# Patient Record
Sex: FEMALE | Race: WHITE | NOT HISPANIC OR LATINO
[De-identification: names, ages, dates, MRNs, and addresses within clinical notes are randomized per-mention and may not be internally consistent; named-entity substitution may affect disease eponyms.]

---

## 2017-03-31 PROBLEM — Z00.00 ENCOUNTER FOR PREVENTIVE HEALTH EXAMINATION: Status: ACTIVE | Noted: 2017-03-31

## 2017-04-04 ENCOUNTER — APPOINTMENT (OUTPATIENT)
Dept: PLASTIC SURGERY | Facility: CLINIC | Age: 22
End: 2017-04-04

## 2017-05-15 ENCOUNTER — RECORD ABSTRACTING (OUTPATIENT)
Age: 22
End: 2017-05-15

## 2017-05-30 ENCOUNTER — APPOINTMENT (OUTPATIENT)
Dept: PLASTIC SURGERY | Facility: CLINIC | Age: 22
End: 2017-05-30

## 2017-08-26 ENCOUNTER — EMERGENCY (EMERGENCY)
Facility: HOSPITAL | Age: 22
LOS: 0 days | Discharge: HOME | End: 2017-08-26
Admitting: PEDIATRICS

## 2017-08-26 DIAGNOSIS — N76.0 ACUTE VAGINITIS: ICD-10-CM

## 2017-08-26 DIAGNOSIS — B96.89 OTHER SPECIFIED BACTERIAL AGENTS AS THE CAUSE OF DISEASES CLASSIFIED ELSEWHERE: ICD-10-CM

## 2017-08-26 DIAGNOSIS — N89.8 OTHER SPECIFIED NONINFLAMMATORY DISORDERS OF VAGINA: ICD-10-CM

## 2018-02-27 ENCOUNTER — APPOINTMENT (OUTPATIENT)
Dept: PLASTIC SURGERY | Facility: CLINIC | Age: 23
End: 2018-02-27
Payer: MEDICAID

## 2018-02-27 DIAGNOSIS — M65.4 RADIAL STYLOID TENOSYNOVITIS [DE QUERVAIN]: ICD-10-CM

## 2018-02-27 PROCEDURE — 20550 NJX 1 TENDON SHEATH/LIGAMENT: CPT

## 2018-02-27 PROCEDURE — 99212 OFFICE O/P EST SF 10 MIN: CPT | Mod: 25

## 2018-04-26 ENCOUNTER — APPOINTMENT (OUTPATIENT)
Dept: PLASTIC SURGERY | Facility: CLINIC | Age: 23
End: 2018-04-26
Payer: MEDICAID

## 2018-04-26 PROCEDURE — 99212 OFFICE O/P EST SF 10 MIN: CPT

## 2018-07-13 ENCOUNTER — APPOINTMENT (OUTPATIENT)
Dept: PLASTIC SURGERY | Facility: CLINIC | Age: 23
End: 2018-07-13

## 2018-07-16 ENCOUNTER — EMERGENCY (EMERGENCY)
Facility: HOSPITAL | Age: 23
LOS: 0 days | Discharge: HOME | End: 2018-07-16
Admitting: EMERGENCY MEDICINE

## 2018-07-16 VITALS
TEMPERATURE: 99 F | OXYGEN SATURATION: 100 % | HEART RATE: 88 BPM | SYSTOLIC BLOOD PRESSURE: 124 MMHG | DIASTOLIC BLOOD PRESSURE: 85 MMHG

## 2018-07-16 DIAGNOSIS — M54.9 DORSALGIA, UNSPECIFIED: ICD-10-CM
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
## 2018-07-16 NOTE — ED PROVIDER NOTE - PHYSICAL EXAMINATION
CONST: Well appearing in NAD  EYES: PERRL, EOMI, Sclera and conjunctiva clear.   ENT: No nasal discharge. TM's clear B/L without drainage. Oropharynx normal appearing, no erythema or exudates. Uvula midline.  NECK: Non-tender, normal ROM  CARD: Normal S1 S2; Normal rate and rhythm  RESP: Equal BS B/L, No wheezes, rhonchi or rales. No distress  GI: Soft, non-tender, non-distended.  MS: + paraspinal tenderness throughout, pulses 2 +, Normal ROM in all extremities. No midline spinal tenderness.  SKIN: Warm, dry, no acute rashes. Good turgor  NEURO: A&Ox3, No focal deficits. Strength 5/5 with no sensory deficits. Steady gait

## 2018-07-16 NOTE — ED PROVIDER NOTE - OBJECTIVE STATEMENT
23 year old female with no pmhx presents with back pain since this morning. 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 Pt admits did not take any medication yet for pain. pt denies neck pain, HA, dizziness, paresthesias, or weakness, fever, chills, abdominal pain, or N/V/D.

## 2018-07-16 NOTE — ED PROVIDER NOTE - NS ED ROS FT
Review of Systems:  	•	CONSTITUTIONAL - no fever, no diaphoresis, no chills  	•	SKIN - no rash  	•	EYES - no eye pain, no blurry vision  	•	ENT - no change in hearing, no sore throat, no ear pain or tinnitus  	•	RESPIRATORY - no shortness of breath, no cough  	•	CARDIAC - no chest pain, no palpitations  	•	GI - no abd pain, no nausea, no vomiting, no diarrhea, no constipation  	•	GENITO-URINARY - no discharge, no dysuria; no hematuria, no increased urinary frequency  	•	MUSCULOSKELETAL - + back pain   	•	NEUROLOGIC - no weakness, no headache, no paresthesias, no LOC

## 2018-07-27 ENCOUNTER — APPOINTMENT (OUTPATIENT)
Dept: PLASTIC SURGERY | Facility: CLINIC | Age: 23
End: 2018-07-27

## 2018-11-09 ENCOUNTER — OUTPATIENT (OUTPATIENT)
Dept: OUTPATIENT SERVICES | Facility: HOSPITAL | Age: 23
LOS: 1 days | Discharge: HOME | End: 2018-11-09

## 2018-11-10 DIAGNOSIS — R82.90 UNSPECIFIED ABNORMAL FINDINGS IN URINE: ICD-10-CM

## 2018-11-10 DIAGNOSIS — N39.0 URINARY TRACT INFECTION, SITE NOT SPECIFIED: ICD-10-CM

## 2018-11-10 DIAGNOSIS — Z11.3 ENCOUNTER FOR SCREENING FOR INFECTIONS WITH A PREDOMINANTLY SEXUAL MODE OF TRANSMISSION: ICD-10-CM

## 2019-12-27 ENCOUNTER — EMERGENCY (EMERGENCY)
Facility: HOSPITAL | Age: 24
LOS: 0 days | Discharge: HOME | End: 2019-12-27
Attending: EMERGENCY MEDICINE | Admitting: EMERGENCY MEDICINE
Payer: SUBSIDIZED

## 2019-12-27 VITALS
RESPIRATION RATE: 18 BRPM | DIASTOLIC BLOOD PRESSURE: 56 MMHG | TEMPERATURE: 98 F | OXYGEN SATURATION: 99 % | SYSTOLIC BLOOD PRESSURE: 118 MMHG | HEART RATE: 107 BPM

## 2019-12-27 VITALS
TEMPERATURE: 101 F | RESPIRATION RATE: 16 BRPM | HEIGHT: 61 IN | OXYGEN SATURATION: 97 % | HEART RATE: 122 BPM | DIASTOLIC BLOOD PRESSURE: 64 MMHG | SYSTOLIC BLOOD PRESSURE: 140 MMHG | WEIGHT: 169.98 LBS

## 2019-12-27 DIAGNOSIS — R50.9 FEVER, UNSPECIFIED: ICD-10-CM

## 2019-12-27 DIAGNOSIS — N12 TUBULO-INTERSTITIAL NEPHRITIS, NOT SPECIFIED AS ACUTE OR CHRONIC: ICD-10-CM

## 2019-12-27 DIAGNOSIS — R10.9 UNSPECIFIED ABDOMINAL PAIN: ICD-10-CM

## 2019-12-27 LAB
ALBUMIN SERPL ELPH-MCNC: 4.5 G/DL — SIGNIFICANT CHANGE UP (ref 3.5–5.2)
ALP SERPL-CCNC: 86 U/L — SIGNIFICANT CHANGE UP (ref 30–115)
ALT FLD-CCNC: 32 U/L — SIGNIFICANT CHANGE UP (ref 0–41)
ANION GAP SERPL CALC-SCNC: 15 MMOL/L — HIGH (ref 7–14)
APPEARANCE UR: ABNORMAL
AST SERPL-CCNC: 31 U/L — SIGNIFICANT CHANGE UP (ref 0–41)
BACTERIA # UR AUTO: ABNORMAL
BASE EXCESS BLDV CALC-SCNC: 2.5 MMOL/L — HIGH (ref -2–2)
BASOPHILS # BLD AUTO: 0.03 K/UL — SIGNIFICANT CHANGE UP (ref 0–0.2)
BASOPHILS NFR BLD AUTO: 0.4 % — SIGNIFICANT CHANGE UP (ref 0–1)
BILIRUB SERPL-MCNC: 0.8 MG/DL — SIGNIFICANT CHANGE UP (ref 0.2–1.2)
BILIRUB UR-MCNC: NEGATIVE — SIGNIFICANT CHANGE UP
BUN SERPL-MCNC: 7 MG/DL — LOW (ref 10–20)
CA-I SERPL-SCNC: 1.17 MMOL/L — SIGNIFICANT CHANGE UP (ref 1.12–1.3)
CALCIUM SERPL-MCNC: 9.4 MG/DL — SIGNIFICANT CHANGE UP (ref 8.5–10.1)
CHLORIDE SERPL-SCNC: 99 MMOL/L — SIGNIFICANT CHANGE UP (ref 98–110)
CO2 SERPL-SCNC: 25 MMOL/L — SIGNIFICANT CHANGE UP (ref 17–32)
COLOR SPEC: YELLOW — SIGNIFICANT CHANGE UP
CREAT SERPL-MCNC: 0.9 MG/DL — SIGNIFICANT CHANGE UP (ref 0.7–1.5)
DIFF PNL FLD: ABNORMAL
EOSINOPHIL # BLD AUTO: 0.02 K/UL — SIGNIFICANT CHANGE UP (ref 0–0.7)
EOSINOPHIL NFR BLD AUTO: 0.2 % — SIGNIFICANT CHANGE UP (ref 0–8)
EPI CELLS # UR: 3 /HPF — SIGNIFICANT CHANGE UP (ref 0–5)
GAS PNL BLDV: 140 MMOL/L — SIGNIFICANT CHANGE UP (ref 136–145)
GAS PNL BLDV: SIGNIFICANT CHANGE UP
GLUCOSE SERPL-MCNC: 101 MG/DL — HIGH (ref 70–99)
GLUCOSE UR QL: NEGATIVE — SIGNIFICANT CHANGE UP
HCO3 BLDV-SCNC: 27 MMOL/L — SIGNIFICANT CHANGE UP (ref 22–29)
HCT VFR BLD CALC: 39.5 % — SIGNIFICANT CHANGE UP (ref 37–47)
HCT VFR BLDA CALC: 39.7 % — SIGNIFICANT CHANGE UP (ref 34–44)
HGB BLD CALC-MCNC: 13 G/DL — LOW (ref 14–18)
HGB BLD-MCNC: 13.2 G/DL — SIGNIFICANT CHANGE UP (ref 12–16)
HYALINE CASTS # UR AUTO: 1 /LPF — SIGNIFICANT CHANGE UP (ref 0–7)
IMM GRANULOCYTES NFR BLD AUTO: 0.5 % — HIGH (ref 0.1–0.3)
KETONES UR-MCNC: SIGNIFICANT CHANGE UP
LACTATE BLDV-MCNC: 0.8 MMOL/L — SIGNIFICANT CHANGE UP (ref 0.5–1.6)
LACTATE SERPL-SCNC: 0.9 MMOL/L — SIGNIFICANT CHANGE UP (ref 0.7–2)
LEUKOCYTE ESTERASE UR-ACNC: ABNORMAL
LIDOCAIN IGE QN: 31 U/L — SIGNIFICANT CHANGE UP (ref 7–60)
LYMPHOCYTES # BLD AUTO: 1.45 K/UL — SIGNIFICANT CHANGE UP (ref 1.2–3.4)
LYMPHOCYTES # BLD AUTO: 17.7 % — LOW (ref 20.5–51.1)
MCHC RBC-ENTMCNC: 29.2 PG — SIGNIFICANT CHANGE UP (ref 27–31)
MCHC RBC-ENTMCNC: 33.4 G/DL — SIGNIFICANT CHANGE UP (ref 32–37)
MCV RBC AUTO: 87.4 FL — SIGNIFICANT CHANGE UP (ref 81–99)
MONOCYTES # BLD AUTO: 1.26 K/UL — HIGH (ref 0.1–0.6)
MONOCYTES NFR BLD AUTO: 15.4 % — HIGH (ref 1.7–9.3)
NEUTROPHILS # BLD AUTO: 5.4 K/UL — SIGNIFICANT CHANGE UP (ref 1.4–6.5)
NEUTROPHILS NFR BLD AUTO: 65.8 % — SIGNIFICANT CHANGE UP (ref 42.2–75.2)
NITRITE UR-MCNC: NEGATIVE — SIGNIFICANT CHANGE UP
NRBC # BLD: 0 /100 WBCS — SIGNIFICANT CHANGE UP (ref 0–0)
PCO2 BLDV: 39 MMHG — LOW (ref 41–51)
PH BLDV: 7.44 — HIGH (ref 7.26–7.43)
PH UR: 6 — SIGNIFICANT CHANGE UP (ref 5–8)
PLATELET # BLD AUTO: 205 K/UL — SIGNIFICANT CHANGE UP (ref 130–400)
PO2 BLDV: 37 MMHG — SIGNIFICANT CHANGE UP (ref 20–40)
POTASSIUM BLDV-SCNC: 3.8 MMOL/L — SIGNIFICANT CHANGE UP (ref 3.3–5.6)
POTASSIUM SERPL-MCNC: 4.7 MMOL/L — SIGNIFICANT CHANGE UP (ref 3.5–5)
POTASSIUM SERPL-SCNC: 4.7 MMOL/L — SIGNIFICANT CHANGE UP (ref 3.5–5)
PROT SERPL-MCNC: 7.8 G/DL — SIGNIFICANT CHANGE UP (ref 6–8)
PROT UR-MCNC: ABNORMAL
RBC # BLD: 4.52 M/UL — SIGNIFICANT CHANGE UP (ref 4.2–5.4)
RBC # FLD: 11.8 % — SIGNIFICANT CHANGE UP (ref 11.5–14.5)
RBC CASTS # UR COMP ASSIST: 5 /HPF — HIGH (ref 0–4)
SAO2 % BLDV: 76 % — SIGNIFICANT CHANGE UP
SODIUM SERPL-SCNC: 139 MMOL/L — SIGNIFICANT CHANGE UP (ref 135–146)
SP GR SPEC: 1.02 — SIGNIFICANT CHANGE UP (ref 1.01–1.02)
UROBILINOGEN FLD QL: SIGNIFICANT CHANGE UP
WBC # BLD: 8.2 K/UL — SIGNIFICANT CHANGE UP (ref 4.8–10.8)
WBC # FLD AUTO: 8.2 K/UL — SIGNIFICANT CHANGE UP (ref 4.8–10.8)
WBC UR QL: 254 /HPF — HIGH (ref 0–5)

## 2019-12-27 PROCEDURE — 99284 EMERGENCY DEPT VISIT MOD MDM: CPT

## 2019-12-27 PROCEDURE — 99053 MED SERV 10PM-8AM 24 HR FAC: CPT

## 2019-12-27 PROCEDURE — 74177 CT ABD & PELVIS W/CONTRAST: CPT | Mod: 26

## 2019-12-27 RX ORDER — CEFTRIAXONE 500 MG/1
1000 INJECTION, POWDER, FOR SOLUTION INTRAMUSCULAR; INTRAVENOUS ONCE
Refills: 0 | Status: COMPLETED | OUTPATIENT
Start: 2019-12-27 | End: 2019-12-27

## 2019-12-27 RX ORDER — SODIUM CHLORIDE 9 MG/ML
1000 INJECTION, SOLUTION INTRAVENOUS ONCE
Refills: 0 | Status: COMPLETED | OUTPATIENT
Start: 2019-12-27 | End: 2019-12-27

## 2019-12-27 RX ORDER — CEFDINIR 250 MG/5ML
1 POWDER, FOR SUSPENSION ORAL
Qty: 20 | Refills: 0
Start: 2019-12-27 | End: 2020-01-05

## 2019-12-27 RX ORDER — SODIUM CHLORIDE 9 MG/ML
2000 INJECTION INTRAMUSCULAR; INTRAVENOUS; SUBCUTANEOUS ONCE
Refills: 0 | Status: COMPLETED | OUTPATIENT
Start: 2019-12-27 | End: 2019-12-27

## 2019-12-27 RX ORDER — ACETAMINOPHEN 500 MG
975 TABLET ORAL ONCE
Refills: 0 | Status: COMPLETED | OUTPATIENT
Start: 2019-12-27 | End: 2019-12-27

## 2019-12-27 RX ADMIN — CEFTRIAXONE 100 MILLIGRAM(S): 500 INJECTION, POWDER, FOR SOLUTION INTRAMUSCULAR; INTRAVENOUS at 05:20

## 2019-12-27 RX ADMIN — Medication 975 MILLIGRAM(S): at 05:40

## 2019-12-27 RX ADMIN — SODIUM CHLORIDE 4000 MILLILITER(S): 9 INJECTION INTRAMUSCULAR; INTRAVENOUS; SUBCUTANEOUS at 05:10

## 2019-12-27 RX ADMIN — SODIUM CHLORIDE 1000 MILLILITER(S): 9 INJECTION, SOLUTION INTRAVENOUS at 07:44

## 2019-12-27 RX ADMIN — Medication 975 MILLIGRAM(S): at 05:10

## 2019-12-27 RX ADMIN — CEFTRIAXONE 1000 MILLIGRAM(S): 500 INJECTION, POWDER, FOR SOLUTION INTRAMUSCULAR; INTRAVENOUS at 05:50

## 2019-12-27 RX ADMIN — SODIUM CHLORIDE 2000 MILLILITER(S): 9 INJECTION INTRAMUSCULAR; INTRAVENOUS; SUBCUTANEOUS at 06:10

## 2019-12-27 NOTE — ED PROVIDER NOTE - NS ED ROS FT
Review of Systems    Constitutional: (+) fever   Eyes/ENT: (-) vision changes  Cardiovascular: (-) chest pain, (-) syncope (-) palpitations  Respiratory: (-) cough, (-) shortness of breath  Gastrointestinal: (-) vomiting, (-) diarrhea (-) abdominal pain  Genitourinary:  (+) dysuria   Musculoskeletal: (-) neck pain, (+) back pain, (-) leg pain/swelling  Integumentary: (-) rash, (-) edema  Neurological: (-) headache  Hematologic: (-) easy bruising   Allergic/Immunologic: (-) pruritus

## 2019-12-27 NOTE — ED PROVIDER NOTE - OBJECTIVE STATEMENT
23 y/o F with PMH frequent UTIs presetns with constant, throbbing, moderate, non-radiating L flank pain x 2 days also with fever tmax 102.9F x 5 days.   did have dysuria x 2 wks, took azo with improvement and still has dysuria/increased frequency of urination, but minimal.   has been taking tylenol cold and flu without benefit.   no palliating/provoking factors.   no hx abdominal surgeries.

## 2019-12-27 NOTE — ED PROVIDER NOTE - CLINICAL SUMMARY MEDICAL DECISION MAKING FREE TEXT BOX
Patient presented with dysuria, (+) CVA tenderness on exam. Initially (+) febrile and tachycardic in ED, but normotensive. Seen on arrival and started on IVF, anti-pyretic. Obtained labs which were negative for significant leukocytosis, anemia, LAW or dehydration or any other significant abnormalities. UA confirmed (+) infection, and urine culture sent. CT abd/pelvis done to rule out septic stone which was negative, but confirmed diagnosis of pyelonephritis. Patient given dose of IV ceftriaxone in ED. After tx in ED patient's VS normalized and patient afebrile, felt much better. Ambulatory, tolerating PO. Will discharge home on PO abx and return precautions. Patient agreeable with plan. Agrees to return to ED for any new or worsening symptoms.

## 2019-12-27 NOTE — ED PROVIDER NOTE - ATTENDING CONTRIBUTION TO CARE
I personally evaluated the patient. I reviewed the Resident’s or Physician Assistant’s note (as assigned above), and agree with the findings and plan except as documented in my note.    Pt is a 25 y/o female with no PMH who presents to ED for dysuria and frequency for 1 weeks, moderate, constant, originally improved with cranberry pills but worse over past several days. + fever up to 102 and left flank pain. No n/v.    Constitutional: Well developed, well nourished. NAD.  Head: Normocephalic, atraumatic.  Eyes: PERRL. EOMI.  ENT: No nasal discharge. Mucous membranes moist.  Neck: Supple. Painless ROM.  Cardiovascular: Normal S1, S2. Regular rate and rhythm. No murmurs, rubs, or gallops.  Pulmonary: Normal respiratory rate and effort. Lungs clear to auscultation bilaterally. No wheezing, rales, or rhonchi.  Abdominal: Soft. Nondistended. Nontender. No rebound, guarding, rigidity. + left CVA tenderness.  Extremities. Pelvis stable. No lower extremity edema, symmetric calves.  Skin: No rashes, cyanosis.  Neuro: AAOx3. No focal neurological deficits.  Psych: Normal mood. Normal affect.

## 2019-12-27 NOTE — ED PROVIDER NOTE - PATIENT PORTAL LINK FT
You can access the FollowMyHealth Patient Portal offered by [facility] by registering at the following website: [email or web address]. By joining [facility]’s FollowMyHealth portal, you will also be able to view your health information using other applications (apps) compatible with our system.

## 2019-12-27 NOTE — ED ADULT TRIAGE NOTE - CHIEF COMPLAINT QUOTE
Patient presents to ED with intermittent fever for 5 days, left flank pain for 2 days worsening tonight, and UTI symptoms. Patient reports she gets UTIs often and became concerned when she continued to have fever.

## 2019-12-27 NOTE — ED ADULT NURSE NOTE - NSIMPLEMENTINTERV_GEN_ALL_ED
Implemented All Universal Safety Interventions:  [place] to call system. Call bell, personal items and telephone within reach. Instruct patient to call for assistance. Room bathroom lighting operational. Non-slip footwear when patient is off stretcher. Physically safe environment: no spills, clutter or unnecessary equipment. Stretcher in lowest position, wheels locked, appropriate side rails in place.

## 2019-12-27 NOTE — ED PROVIDER NOTE - NSFOLLOWUPINSTRUCTIONS_ED_ALL_ED_FT
You have a  kidney infectioncalled pyelonephritis. The infection can damage your kidneys and cause bacteria to enter your bloodstream. You were treated in the hospital with IV antibiotics and your symptoms improved.    Take all the medicine you were prescribed, even if you feel better. Not finishing the medicine can make the infection come back. It may also make a future infection harder to treat. Make sure to drink 8 to 12 glasses of fluid every day. Clear fluids, such as water, are best. To prevent future infection, always wipe the genital area from front to back and urinate frequently. Avoid holding urine in the bladder for a long time. Always urinate after sexual intercourse.    Seek medical attention immediately if you have any of the following:    Decreased urine output or trouble urinating  Severe pain in the lower back or flank  Fever of 100.4°F (38°C) or higher, or as directed by your healthcare provider  Shaking chills  Vomiting  Blood in your urine  Dark-colored or foul-smelling urine  Nausea or other problems that prevent you from taking your prescribed medicine

## 2019-12-27 NOTE — ED ADULT NURSE REASSESSMENT NOTE - NS ED NURSE REASSESS COMMENT FT1
Report received, patient assessed, patient reports relief of left flank pain at this time. Patient awaiting CT scan of abdomen and pelvis with IV contrast. Vital signs obtained and stable, will continue to monitor.

## 2019-12-27 NOTE — ED ADULT NURSE NOTE - OBJECTIVE STATEMENT
pt states she has hx of recurrent UTI's and bacteria in urine. now c/o left sided flank pain, fever, and generalized weakness.

## 2019-12-27 NOTE — ED PROVIDER NOTE - PHYSICAL EXAMINATION
PHYSICAL EXAM:    GENERAL: Alert, appears stated age, well appearing, non-toxic  SKIN: Warm, pink and dry. MMM.   EYE: Normal lids/conjunctiva  ENT: Normal hearing, patent oropharynx   NECK: +supple. No meningismus  Pulm: Bilateral BS, normal resp effort, no wheezes, stridor, or retractions  CV: RRR, no M/R/G, 2+and = radial pulses  Abd: soft, non-tender, non-distended, no hepatosplenomegaly. +L CVA tenderness. no rebound/guarding.   Mskel: no erythema, cyanosis, edema. no calf tenderness  Neuro: AAOx3, normal gait.

## 2020-01-01 LAB
CULTURE RESULTS: SIGNIFICANT CHANGE UP
CULTURE RESULTS: SIGNIFICANT CHANGE UP
SPECIMEN SOURCE: SIGNIFICANT CHANGE UP
SPECIMEN SOURCE: SIGNIFICANT CHANGE UP

## 2020-02-25 ENCOUNTER — EMERGENCY (EMERGENCY)
Facility: HOSPITAL | Age: 25
LOS: 0 days | Discharge: HOME | End: 2020-02-25
Admitting: EMERGENCY MEDICINE
Payer: OTHER MISCELLANEOUS

## 2020-02-25 VITALS
SYSTOLIC BLOOD PRESSURE: 144 MMHG | HEART RATE: 110 BPM | RESPIRATION RATE: 18 BRPM | TEMPERATURE: 99 F | DIASTOLIC BLOOD PRESSURE: 81 MMHG | OXYGEN SATURATION: 100 %
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
## 2020-02-25 DIAGNOSIS — M79.645 PAIN IN LEFT FINGER(S): ICD-10-CM
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
## 2020-02-25 DIAGNOSIS — S69.92XA UNSPECIFIED INJURY OF LEFT WRIST, HAND AND FINGER(S), INITIAL ENCOUNTER: ICD-10-CM

## 2020-02-25 PROCEDURE — 73140 X-RAY EXAM OF FINGER(S): CPT | Mod: 26,LT

## 2020-02-25 PROCEDURE — 99283 EMERGENCY DEPT VISIT LOW MDM: CPT

## 2020-02-25 NOTE — ED PROVIDER NOTE - NS ED ROS FT
Constitutional: (-) fever  Skin: (-) rash  Muskuloskeletal: (+) left 2nd finger injury  Neurological: (-) altered mental status

## 2020-02-25 NOTE — ED ADULT NURSE NOTE - NSIMPLEMENTINTERV_GEN_ALL_ED
Implemented All Universal Safety Interventions:  [place] to call system. Call bell, personal items and telephone within reach. Instruct patient to call for assistance. Room bathroom lighting operational. Non-slip footwear when patient is off stretcher. Physically safe environment: no spills, clutter or unnecessary equipment. Stretcher in lowest position, wheels locked, appropriate side rails in place.

## 2020-02-25 NOTE — ED PROVIDER NOTE - OBJECTIVE STATEMENT
25 yo F c/o left 2nd finger injury. 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 Right hand dominant.

## 2020-02-25 NOTE — ED PROVIDER NOTE - CARE PROVIDER_API CALL
Merlin Navarro)  Orthopaedic Surgery  [address] [place] [address]  Phone: [phone]  Fax: [phone]  Follow Up Time: 1-3 Days

## 2020-02-25 NOTE — ED PROVIDER NOTE - NSFOLLOWUPINSTRUCTIONS_ED_ALL_ED_FT
today Contusion    A contusion is a deep bruise. Contusions are the result of a blunt injury to tissues and muscle fibers under the skin. The skin overlying the contusion may turn blue, purple, or yellow. Symptoms also include pain and swelling in the injured area. Symptoms should resolve with time.     SEEK IMMEDIATE MEDICAL CARE IF YOU HAVE THE FOLLOWING SYMPTOMS: severe pain, numbness, tingling, pain, weakness, or skin color/temperature change in any part of your body distal to the fracture.

## 2020-02-25 NOTE — ED PROVIDER NOTE - PATIENT PORTAL LINK FT
You can access the FollowMyHealth Patient Portal offered by [facility] by registering at the following website: [email or web address]. By joining [facility]’s FollowMyHealth portal, you will also be able to view your health information using other applications (apps) compatible with our system.

## 2020-02-25 NOTE — ED PROVIDER NOTE - PHYSICAL EXAMINATION
Physical Exam    Vital Signs: I have reviewed the initial vital signs.  Constitutional: well-nourished, appears stated age, no acute distress  Skin: warm, dry  Muskuloskeletal: left hand: +tender, swelling, and  decreased ROM to base of left 2nd finger. No ecchymosis. n/v intact  Neuro: AOx3, No focal deficits noted Physical Exam    Vital Signs: I have reviewed the initial vital signs.  Constitutional: well-nourished, appears stated age, no acute distress  Skin: warm, dry  Muskuloskeletal: left hand: +tender, swelling, and  decreased ROM to base of left 2nd finger. No ecchymosis. n/v intact. Tendon function normal  Neuro: AOx3, No focal deficits noted

## 2020-02-25 NOTE — ED ADULT NURSE NOTE - CAS DISCH TRANSFER METHOD
Patient : Haley NOLAN [facility] Age: 79 year old Sex: female   MRN: [id number] Encounter Date: 12/20/2019      History     Chief Complaint   Patient presents with   • Dizziness   • Hypertension     HPI    79-year-old female with past medical history significant for polycythemia vera, generalized anxiety disorder, hypertension presents to the emergency department with multiple complaints.  Patient reports at least a 10 day history of ear fullness, neck pain, dizziness. Does have difficulty describing the dizziness although it does sound more like room spinning, off-balance.  States that it has worsened over the last 48 hours.  This morning at 2:00 a.m. awoke with the feeling of palpitations and hand tingling.  Was very short of breath at that time.  Those symptoms have improved.  States that she still feels very anxious.  Took her blood pressure this morning and it was elevated.  No chest pain currently.  Does also report coolness to her bilateral legs without pain.  No fevers.  Does have posterior neck pain which has been an ongoing issue for her.  Did see a massage therapist previously without improvement.  No urinary symptoms.  No abdominal pain.  Does have mild nausea without vomiting.  No other recent illness or injury.    No Known Allergies    Discharge Medication List as of 12/20/2019  1:40 PM      Prior to Admission Medications    Details   hydroxyurea (HYDREA) 500 MG capsule TAKE 1 CAPSULE BY MOUTH ONCE DAILYEprescribe, Disp-90 capsule, R-0      hydrochlorothiazide (HYDRODIURIL) 12.5 MG tablet Take 1 tablet by mouth daily.Eprescribe, Disp-90 tablet, R-3Put refills on file, do not fill until patient calls      atorvastatin (LIPITOR) 80 MG tablet TAKE 1 TABLET BY MOUTH NIGHTLYEprescribe, Disp-90 tablet, R-3      hydroxyurea (HYDREA) 500 MG capsule Take by mouth daily.Historical Med      Multiple Vitamin (MULTI-VITAMIN PO) Take by mouth daily. Historical Med      calcium carbonate-vitamin D (CALTRATE+D) 600-400 MG-UNIT  per tablet Take 1 tablet by mouth daily.Historical Med      aspirin (ECOTRIN) 81 MG EC tablet Take 81 mg by mouth daily.Historical Med             Discharge Medication List as of 2019  1:40 PM      New Prescriptions    Details   meclizine (ANTIVERT) 25 MG tablet Take 1 tablet by mouth 3 times daily as needed for Dizziness.Eprescribe, Disp-10 tablet, R-0             Past Medical History:   Diagnosis Date   • Ectropion of left lower eyelid    • Esophageal reflux disease     SOMETIMES   • Glaucoma (increased eye pressure)     A START OF GLAUCOMA/   • High cholesterol    • Malignant neoplasm (CMS/HCC)     SKIN CANCER   • Nuclear senile cataract of both eyes    • Osteopenia 2011   • PVD (posterior vitreous detachment), both eyes    • Sinusitis, chronic        Past Surgical History:   Procedure Laterality Date   • ANES TONSILLECTOMY PRIM/SECNDRY; AGE 14/     • APPENDECTOMY     • CARPAL TUNNEL RELEASE Bilateral 1985    BOTH WRISTS   • COLONOSCOPY W BIOPSY  2005   • DEXA BONE DENSITY AXIAL SKELETON  2011   • VARICOSE VEIN SURGERY Left 2019    Left GSV RFA & Stab Phlebectomy by Dr. Bernard Heath       Family History   Problem Relation Age of Onset   • Cancer Mother 85        PANCREAS   • High cholesterol Mother    • Cataracts Mother    • Hypertension Mother    • Heart disease Father    • High blood pressure Father    • High cholesterol Sister    • Heart disease Brother    • High cholesterol Brother    • High blood pressure Brother    • Cataracts Sister        Social History     Tobacco Use   • Smoking status: Former Smoker     Packs/day: 0.50     Years: 3.00     Pack years: 1.50     Types: Cigarettes     Last attempt to quit: 1970     Years since quittin.0   • Smokeless tobacco: Never Used   Substance Use Topics   • Alcohol use: No     Frequency: Never     Drinks per session: 1 or 2     Binge frequency: Never     Comment: NONE    • Drug use: No     Comment: NONE        Review  of Systems  Constitutional, Eyes, ENT, Pulmonary, Cardiovascular, Gastrointestinal, Renal, Endocrine, Genitourinary, Musculoskeletal, Neurologic, Skin, and Psychiatric systems were reviewed and negative unless indicated in the HPI above.     Physical Exam     ED Triage Vitals [12/20/19 1015]   ED Triage Vitals Group      Temp 97.9 °F (36.6 °C)      Pulse 84      Resp 20      BP (!) 186/102      SpO2 98 %      EtCO2 mmHg       Height       Weight       Weight Scale Used       BMI (Calculated)       IBW/kg (Calculated)        Physical Exam   Constitutional: She is oriented to person, place, and time. She appears well-developed and well-nourished. She is cooperative. No distress.   HENT:   Head: Normocephalic and atraumatic.   Eyes: Pupils are equal, round, and reactive to light. Conjunctivae and EOM are normal. Right eye exhibits no discharge. Left eye exhibits no discharge.   Neck: Normal range of motion. No tracheal deviation present.   Cardiovascular: Normal rate, regular rhythm and normal heart sounds. Exam reveals no gallop and no friction rub.   No murmur heard.  Pulses:       Radial pulses are 2+ on the right side.   Pulmonary/Chest: Effort normal and breath sounds normal. No stridor. No respiratory distress. She has no wheezes. She has no rales. She exhibits no tenderness.   Abdominal: Soft. She exhibits no distension. There is no tenderness. There is no rigidity, no rebound and no guarding. No hernia. Musculoskeletal: Normal range of motion.         General: No edema.     Neurological: She is alert and oriented to person, place, and time. She has normal strength. She is not disoriented. GCS eye subscore is 4. GCS verbal subscore is 5. GCS motor subscore is 6.   Alert and oriented x4.  GCS 15.  Cranial nerves II-XII intact.  Normal strength and sensation in the bilateral upper and lower extremities. Normal cerebellar function as tested by finger-to-nose testing.  Normal repetition.  Normal speech. Normal  naming of objects. 3/3 item recall at 5 min.   Skin: Skin is warm and dry. No rash noted. She is not diaphoretic.   Psychiatric: She has a normal mood and affect. Her speech is normal and behavior is normal.   Nursing note and vitals reviewed.      ED Course     Procedures    Lab Results     Results for orders placed or performed during the hospital encounter of 12/20/19   CBC with Automated Differential   Result Value Ref Range    WBC 6.2 4.2 - 11.0 K/mcL    RBC 4.27 4.00 - 5.20 mil/mcL    HGB 14.9 12.0 - 15.5 g/dL    HCT 43.5 36.0 - 46.5 %    .9 (H) 78.0 - 100.0 fl    MCH 34.9 (H) 26.0 - 34.0 pg    MCHC 34.3 32.0 - 36.5 g/dL    RDW-CV 14.2 11.0 - 15.0 %     140 - 450 K/mcL    NRBC 0 0 /100 WBC    DIFF TYPE AUTOMATED DIFFERENTIAL     Neutrophil 79 %    LYMPH 10 %    MONO 9 %    EOSIN 0 %    BASO 1 %    Percent Immature Granuloctyes 1 %    Absolute Neutrophil 4.9 1.8 - 7.7 K/mcL    Absolute Lymph 0.6 (L) 1.0 - 4.0 K/mcL    Absolute Mono 0.5 0.3 - 0.9 K/mcL    Absolute Eos 0.0 (L) 0.1 - 0.5 K/mcL    Absolute Baso 0.0 0.0 - 0.3 K/mcL    Absolute Immature Granulocytes 0.0 0 - 0.2 K/mcl   COMPREHENSIVE METABOLIC PANEL   Result Value Ref Range    Sodium 132 (L) 135 - 145 mmol/L    Potassium 3.9 3.4 - 5.1 mmol/L    Chloride 96 (L) 98 - 107 mmol/L    Carbon Dioxide 26 21 - 32 mmol/L    Anion Gap 14 10 - 20 mmol/L    Glucose 109 (H) 65 - 99 mg/dL    BUN 11 6 - 20 mg/dL    Creatinine 0.57 0.51 - 0.95 mg/dL    GFR Estimate,  >90     GFR Estimate, Non  89     BUN/Creatinine Ratio 19 7 - 25    CALCIUM 9.3 8.4 - 10.2 mg/dL    TOTAL BILIRUBIN 0.6 0.2 - 1.0 mg/dL    AST/SGOT 19 <38 Units/L    ALT/SGPT 25 <64 Units/L    ALK PHOSPHATASE 91 45 - 117 Units/L    TOTAL PROTEIN 7.3 6.4 - 8.2 g/dL    Albumin 4.3 3.6 - 5.1 g/dL    GLOBULIN 3.0 2.0 - 4.0 g/dL    A/G Ratio, Serum 1.4 1.0 - 2.4   Troponin I Ultra Sensitive   Result Value Ref Range    TROPONIN I <0.02 <0.05 ng/mL   Lipase   Result  Value Ref Range    Lipase 116 73 - 393 Units/L   Magnesium   Result Value Ref Range    MAGNESIUM 1.9 1.7 - 2.4 mg/dL   URINALYSIS & REFLEX MICRO WITH CULTURE IF INDICATED   Result Value Ref Range    COLOR YELLOW YELLOW    APPEARANCE CLEAR     GLUCOSE(URINE) NEGATIVE NEGATIVE mg/dL    BILIRUBIN NEGATIVE NEGATIVE    KETONES NEGATIVE NEGATIVE mg/dL    SPECIFIC GRAVITY <1.005 (L) 1.005 - 1.030    BLOOD NEGATIVE NEGATIVE    pH 7.0 5.0 - 7.0 Units    PROTEIN(URINE) NEGATIVE NEGATIVE mg/dL    UROBILINOGEN 0.2 0.0 - 1.0 mg/dL    NITRITE NEGATIVE NEGATIVE    LEUKOCYTE ESTERASE NEGATIVE NEGATIVE    SPECIMEN TYPE URINE CLEAN CATCH        EKG Results     EKG Interpretation  Rate: 81  Rhythm: normal sinus rhythm   NO STEMI  No Ischemic Changes  Abnormality: no    EKG tracing interpreted by ED physician    Radiology Results     Imaging Results          CT ANGIOGRAM HEAD NECK W CONTRAST (Final result)  Result time 12/20/19 13:01:05    Final result                 Impression:    IMPRESSION:  CT Head:  1. Small left frontal meningioma redemonstrated.    CTA Neck:  1. Patent cervical carotid and vertebral arteries without hemodynamically  significant stenosis.        CTA Head:  1. Patent major intracranial arterial vasculature without large vessel  occlusion or high-grade acquired stenosis.        Other:  1. Multiple degenerative changes of the cervical and upper thoracic spine.  2. 2 mm anterior right upper lobe lung nodule. Follow-up per Fleischner  Society criteria, detailed in the body of this report.          //Location Code: [facility]               Narrative:    CT ANGIOGRAM HEAD NECK W CONTRAST    CLINICAL INFORMATION:   Neck pain, dizziness for 10 days. ? Vert  dissection, posterior circulation stroke    COMPARISON:  CT head without contrast same day 12/20/2019, MRI brain  7/2/2018.    TECHNIQUE:  Using a multidetector, multislice helical scanner,     CTA of  the intracranial and extracranial circulation was acquired  after  administration of 75 cc of Isovue-370 intravenously.  Subsequently, delayed  phase CT head was performed with standard technique.  MIP      reconstructions are evaluated.          Determination of the degree of stenosis in the internal carotid arteries is  obtained using measurements of distal internal carotid diameter (directly  or indirectly) as the denominator for stenosis measurement.  The method  utilized is similar to that utilized in the [employer] Symptomatic  Carotid Endarterectomy Trial (NASCET) method.  If the degree of stenosis is  greater than 30%, the actual percentage stenosis is given in the body of  the report.       FINDINGS:    CT HEAD WITH CONTRAST:  Patchy white matter hypoattenuation, nonspecific but likely secondary to  advanced chronic microvascular ischemic change. Anterior left frontal  convexity partially calcified extra-axial dural based mass measuring 0.8 x  0.6 x 0.7 cm, compatible with meningioma, also seen on MRI from 7/2/2018.    CTA NECK:  Aortic arch: Mild to moderate atherosclerosis of the aortic arch without  significant narrowing. Brachiocephalic and subclavian arteries are patent  without significant stenosis.    Right carotid: Mild carotid bifurcation atherosclerosis without  hemodynamically significant narrowing of the common carotid artery,  internal cartotid artery, or proximal external carotid artery.                   Left carotid: Mild carotid bifurcation atherosclerosis without  hemodynamically significant narrowing of the common carotid artery,  internal cartotid artery, or proximal external carotid artery.                   Vertebral arteries: The vertebral arteries are patent without significant  stenosis. Left vertebral artery originates off the aortic arch, normal  variant.              CTA HEAD:  Anterior circulation:    R ICA: Patent. Mild atherosclerosis without significant narrowing.  R MCA: Patent.      R VANESA: Patent.      L ICA: Patent. Mild  atherosclerosis without significant narrowing.  L MCA: Patent.      L VANESA: Patent.        Posterior circulation:  R vertebral artery: Patent.      L vertebral artery: Patent.       Basilar artery: Patent.       R PCA: Patent.  L PCA: Patent.  PICA: Origins not well seen off the vertebral artery, possible bilateral  AICA/PICA complexes.  AICA: Patent origins.  SCA: Patent origins.    Collateral Circulation:  Anterior communicator:  Patent.  Right Posterior communicator: Patent.  Left Posterior communicator: Patent. Prominent infundibular origin.    No intracranial aneurysm, high-grade stenosis, or vascular malformation  identified.      The major dural venous sinuses appear appropriately opacified with  contrast.         Other findings:  Multilevel degenerative changes of the cervical spine with facet  arthropathy, uncovertebral hypertrophy, and degenerative disc disease.  Osseous fusion of the right C5-C6 facets. Multilevel spondylolisthesis,  likely associated with degenerative change. Left T4 posterior element  sclerosis, nonspecific.    2 mm anterior right upper lobe nodule (series 3 image 66).      ----------------------------------  Fleischner Society Guidelines 2017    Solitary or Multiple Solid <6 mm    Low Risk:  No routine followup.    High Risk: Optional 12-month CT (suspicious morphology and/or upper lobe  location).      NOTES   - Does not apply to patients <34 yo, lung cancer screening CT, patients  with immunosuppression, or patients with known primary cancer.   - Measurements refer to mean axial diameter, rounded to the nearest  millimeter.   - Subsolid nodule categories refer to mean diameter of the entire nodule,  including both ground-glass and solid components.    Radiology. 2017 Feb 23:[id number]  ----------------------------------                               CT HEAD WO CONTRAST (Final result)  Result time 12/20/19 11:49:17    Final result                 Impression:    IMPRESSION:  Stable  noncontrast CT the head.                Narrative:    CT HEAD WITHOUT CONTRAST    CLINICAL INFORMATION:  Dizziness      COMPARISON:  Noncontrast CT the head performed on 7/1/2018.    TECHNIQUE:  Routine noncontrast head CT spanning cranial vertex through  foramen magnum.  Coronal reformats.    FINDINGS:  No acute intracranial hemorrhage, mass effect, midline shift, or  abnormal extraaxial fluid. Hypodensity seen in the white matter consistent  white matter ischemia due to microvascular disease. Similar findings were  present on the previous examination. No CT evidence of an evolving infarct  in a major vascular territory; although, MRI is more sensitive for the  detection of acute ischemia. Previous described heavily calcified rounded  extra-axial structure in the left parietal region again noted and stable                                ED Medication Orders (From admission, onward)    Ordered Start     Status Ordering Provider    12/20/19 1220 12/20/19 1221  sodium chloride (NORMAL SALINE) 0.9 % bolus 1,000 mL  ONCE      Last MAR action:  Completed MERY CARPENTER    12/20/19 1031 12/20/19 1032  meclizine (ANTIVERT) tablet 25 mg  ONCE      Last MAR action:  Given MERY CARPENTER               MDM    VS are WNL. PE showed well appearing female in NAD, normal cardiovascular exam, normal neurologic exam.  I do feel like the patient's presentation today is likely represents peripheral vertigo such as BPPV as patient's dizziness is very positional. Have considered but have low suspicion for central causes of vertigo such as posterior circulation stroke. I have also considered but have low suspicion for neurologic syncope due to subarachnoid hemorrhage versus stroke. Her EKG showed a normal sinus rhythm with normal intervals and no concerning findings making cardiogenic syncope due to dysrhythmia less likely. her neurologic exam here in the emergency department is normal making stroke unlikely.  CT of the head and neck was  obtained to evaluate for posterior circulation, vertebral occlusion, dissection and showed no acute abnormality.  Labs significant for low sodium 132 and chloride 96. Was given 1 L of normal saline in the ED. Patient was given oral dose of meclizine in the ED with significant improvement of her symptoms.    On re-evaluation, patient is back to his baseline. Will discharge with short course of meclizine. Encourage patient to take for as short a time as possible as this medication may prolong her symptoms. Patient does feel safe for discharge home.  I did recommend follow-up with primary care physician.  she will be discharged home with directions to follow up with PCP by calling tomorrow to make an appointment, return to the emergency department with worsening dizziness, syncope, chest pain, shortness of breath nausea, vomiting, diarrhea, dizziness, lightheadedness, vision changes, or any other worrisome concerns.    I have personally and independently reviewed all labs  I have personally and independently reviewed all EKG  I have personally and independently reviewed all radiographic studies  I have personally and independently reviewed previous notes including nursing documentation    Pito Jarvis MD      Clinical Impression     ED Diagnosis   1. Dizziness  SERVICE TO PHYSICAL THERAPY       Disposition        Discharge 12/20/2019  1:37 PM  Haley NOLAN [facility] discharge to home/self care.           Pito Jarvis MD  12/20/19 [id number]     Private car

## 2021-01-21 ENCOUNTER — EMERGENCY (EMERGENCY)
Facility: HOSPITAL | Age: 26
LOS: 0 days | Discharge: HOME | End: 2021-01-21
Attending: EMERGENCY MEDICINE | Admitting: EMERGENCY MEDICINE
Payer: COMMERCIAL

## 2021-01-21 VITALS
HEART RATE: 115 BPM | RESPIRATION RATE: 18 BRPM | SYSTOLIC BLOOD PRESSURE: 152 MMHG | DIASTOLIC BLOOD PRESSURE: 79 MMHG | OXYGEN SATURATION: 99 %

## 2021-01-21 VITALS
OXYGEN SATURATION: 100 % | RESPIRATION RATE: 20 BRPM | HEART RATE: 175 BPM | HEIGHT: 61 IN | DIASTOLIC BLOOD PRESSURE: 89 MMHG | TEMPERATURE: 99 F | SYSTOLIC BLOOD PRESSURE: 176 MMHG
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
## 2021-01-21 DIAGNOSIS — Z23 ENCOUNTER FOR IMMUNIZATION: ICD-10-CM
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
## 2021-01-21 DIAGNOSIS — S80.811A ABRASION, RIGHT LOWER LEG, INITIAL ENCOUNTER: ICD-10-CM

## 2021-01-21 DIAGNOSIS — S70.311A ABRASION, RIGHT THIGH, INITIAL ENCOUNTER: ICD-10-CM

## 2021-01-21 DIAGNOSIS — S51.852A OPEN BITE OF LEFT FOREARM, INITIAL ENCOUNTER: ICD-10-CM
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
## 2021-01-21 DIAGNOSIS — S51.851A OPEN BITE OF RIGHT FOREARM, INITIAL ENCOUNTER: ICD-10-CM

## 2021-01-21 DIAGNOSIS — S61.451A OPEN BITE OF RIGHT HAND, INITIAL ENCOUNTER: ICD-10-CM

## 2021-01-21 DIAGNOSIS — S61.452A OPEN BITE OF LEFT HAND, INITIAL ENCOUNTER: ICD-10-CM

## 2021-01-21 PROCEDURE — 73130 X-RAY EXAM OF HAND: CPT | Mod: 26,50

## 2021-01-21 PROCEDURE — 99284 EMERGENCY DEPT VISIT MOD MDM: CPT

## 2021-01-21 PROCEDURE — 73110 X-RAY EXAM OF WRIST: CPT | Mod: 26,50

## 2021-01-21 PROCEDURE — 93010 ELECTROCARDIOGRAM REPORT: CPT

## 2021-01-21 PROCEDURE — 73090 X-RAY EXAM OF FOREARM: CPT | Mod: 26,50

## 2021-01-21 RX ORDER — HUMAN RABIES VIRUS IMMUNE GLOBULIN 150 [IU]/ML
1600 INJECTION, SOLUTION INTRAMUSCULAR ONCE
Refills: 0 | Status: COMPLETED | OUTPATIENT
Start: 2021-01-21 | End: 2021-01-21

## 2021-01-21 RX ORDER — TETANUS TOXOID, REDUCED DIPHTHERIA TOXOID AND ACELLULAR PERTUSSIS VACCINE, ADSORBED 5; 2.5; 8; 8; 2.5 [IU]/.5ML; [IU]/.5ML; UG/.5ML; UG/.5ML; UG/.5ML
0.5 SUSPENSION INTRAMUSCULAR ONCE
Refills: 0 | Status: COMPLETED | OUTPATIENT
Start: 2021-01-21 | End: 2021-01-21

## 2021-01-21 RX ORDER — RABIES VACC, HUMAN DIPLOID/PF 2.5 UNIT
1 VIAL (EA) INTRAMUSCULAR ONCE
Refills: 0 | Status: COMPLETED | OUTPATIENT
Start: 2021-01-21 | End: 2021-01-21

## 2021-01-21 RX ADMIN — Medication 1 TABLET(S): at 22:15

## 2021-01-21 RX ADMIN — TETANUS TOXOID, REDUCED DIPHTHERIA TOXOID AND ACELLULAR PERTUSSIS VACCINE, ADSORBED 0.5 MILLILITER(S): 5; 2.5; 8; 8; 2.5 SUSPENSION INTRAMUSCULAR at 22:15

## 2021-01-21 RX ADMIN — HUMAN RABIES VIRUS IMMUNE GLOBULIN 1600 UNIT(S): 150 INJECTION, SOLUTION INTRAMUSCULAR at 22:58

## 2021-01-21 RX ADMIN — Medication 1 MILLILITER(S): at 22:59

## 2021-01-21 NOTE — ED PROVIDER NOTE - PHYSICAL EXAMINATION
CONSTITUTIONAL: well-appearing, in NAD  SKIN: Warm dry, normal skin turgor, multiple abrasions to b/l forearms and hands and R leg and thigh. Punctate bite marks to b/l forearms and hands.  HEAD: NCAT  EYES: EOMI, PERRLA, no scleral icterus, conjunctiva pink  ENT: normal pharynx with no erythema or exudates  NECK: Supple; non tender. Full ROM.  CARD: tachy, no murmurs.  RESP: clear to ausculation b/l. No crackles or wheezing.  ABD: soft, non-tender, non-distended, no rebound or guarding.  EXT: Full ROM, no bony tenderness, no pedal edema, no calf tenderness, radial pulses intact and symmetric.  NEURO: normal motor. normal sensory. Normal gait.  PSYCH: Cooperative, appropriate.

## 2021-01-21 NOTE — ED ADULT NURSE NOTE - OBJECTIVE STATEMENT
Pt presents to ED c/o being attacked by a cat she rescued today, pt with scratches and cat bites to b/l forearms, states she has bites and scratches to stomach and legs but wouldn't show RN. Unknown if cat is vaccinated. Pt presents to ED c/o being attacked by a cat she rescued today, pt with scratches and cat bites to b/l forearms, states she has bites and scratches to stomach and right thigh and lower leg. Unknown if cat is vaccinated.

## 2021-01-21 NOTE — ED ADULT NURSE NOTE - CHIEF COMPLAINT QUOTE
pt c/o attacked by a cat she rescued today, pt with scratches and cat bites to b/l forearms, states she has bites and scratches to stomach and legs but wouldn't show in triage. Unknown if cat is vaccinated. Pt  in triage, denies chest pain. Pt states HR always elevated, denies any cardiac dx. EKG completed pt calmed down, repeat .

## 2021-01-21 NOTE — ED PROVIDER NOTE - WR INTERPRETATION 2
MSK XR negative - No fracture, No dislocation, No foreign body, round opacity likely patients name band.

## 2021-01-21 NOTE — ED PROVIDER NOTE - NSFOLLOWUPINSTRUCTIONS_ED_ALL_ED_FT
Please follow up at your local emergency department on days 1/24, 1/28, and 2/4 for your remaining rabies vaccine doses.    Animal Bite    Animal bites can range from mild to serious. An animal bite can result in a scratch on the skin, a deep open cut, a puncture of the skin, a crush injury, or tearing away of the skin or a body part. Treatment includes wound care, updating your tetanus shot, and in some cases, administering a rabies vaccine. If you were prescribed an antibiotic, take it as told by your health care provider. Do not stop using the antibiotic even if your condition improves.      SEEK IMMEDIATE MEDICAL CARE IF YOU HAVE ANY OF THE FOLLOWING SYMPTOMS: red streaking away from the wound, fluid/blood/pus coming from the wound, fever or chills, trouble moving the injured area, numbness or tingling extending beyond the wound.      RABIES VACCINE - General Information     Rabies Vaccine    WHAT YOU NEED TO KNOW:    What is the rabies vaccine? The rabies vaccine is an injection given to help prevent rabies. The rabies virus is spread to humans through the bite of an infected animal. Dogs, bats, skunks, coyotes, raccoons, and foxes are examples of animals that can carry rabies. The rabies vaccine can protect you from being infected with the virus. The vaccine can also prevent you from developing rabies even if you get it after you were bitten by an animal.     When is the vaccine given? Your healthcare provider will tell you how many doses of the vaccine you need. You may need booster shots if you are at high risk for rabies. The following is a common dosing schedule:     Before exposure to the virus, the vaccine is given in 3 doses. The first dose can be given at any time. The second dose is given 7 days after the first. The third dose is given 21 to 28 days after the first. Plan to get all of the doses 3 to 4 weeks before you travel.       After exposure to the virus, the vaccine is given in either 2 or 4 doses:   A person who has not already had the vaccine will usually get 4 doses. The first dose is given immediately. The other doses are given on days 3, 7, and 14 after the exposure. A shot called Rabies Immune Globulin is given at the same time as the first dose. This medicine helps increase your immune system to fight infection.       A person who has already had the vaccine will usually get 2 doses. The first is given immediately, and the second is given on day 3 after the exposure. Rabies Immune Globulin is not given.    Who should get the rabies vaccine?     Anyone who was bitten by an animal that can carry rabies may need the vaccine      Anyone at high risk for rabies, such as people who work with or care for animals or in a rabies laboratory      Anyone who goes into caves where bats live      Anyone who may have had contact with an infected animal      Anyone traveling to another country where rabies is common    Who should not get the rabies vaccine or should wait to get it?     Tell your healthcare provider if you had a severe allergic reaction to a dose of the rabies vaccine in the past, or to other vaccines. If you are getting the vaccine before exposure, do not get another dose. After exposure, you need to get all the doses even if you are at risk for an allergic reaction. Your healthcare provider may need to take extra precautions before you get another dose.      Tell your healthcare provider about all of your allergies. Also tell him if you have a disease that affects your immune system (such as HIV/AIDS) or you have cancer. Tell him if you are taking medicines that affect your immune system or any cancer treatment drug or radiation. Tell him if you are ill. You may need to wait to get the vaccine until you feel better.     What are the risks of the rabies vaccine? You may have a severe allergic reaction. The area where you got the shot may become red, swollen, or painful. You may develop a headache or muscle aches. You may have nausea or pain in your abdomen. You may develop rabies even after you get the vaccine.    Call 911 for any of the following:     Your mouth and throat are swollen.      You are wheezing or have trouble breathing.      You have chest pain or your heart is beating faster than normal for you.      You feel like you are going to faint.    When should I seek immediate care?     Your face is red or swollen.      You have hives that spread over your body.      You feel weak or dizzy.    When should I contact my healthcare provider?     You have increased pain, redness, or swelling around the area where the shot was given.      You have flu-like symptoms.      You have questions or concerns about the rabies vaccine.    CARE AGREEMENT:    You have the right to help plan your care. Learn about your health condition and how it may be treated. Discuss treatment options with your healthcare providers to decide what care you want to receive. You always have the right to refuse treatment.        © Copyright [employer] 2019 All illustrations and images included in CareNotes are the copyrighted property of [employer] or [employer].

## 2021-01-21 NOTE — ED PROVIDER NOTE - CLINICAL SUMMARY MEDICAL DECISION MAKING FREE TEXT BOX
pt with multiple cat bites/scratches. unknown cat. unprovoked.    superfiical scratches to extrmeities with small puncture bite marks. no signs of infection.   will give abx, rabies vacc, tet booster, outpt f/u.

## 2021-01-21 NOTE — ED PROVIDER NOTE - NS ED ROS FT
Constitutional:  (-) fever, (-) chills, (-) lethargy  Eyes:  (-) eye pain (-) visual changes  ENMT: (-) nasal discharge, (-) sore throat. (-) neck pain or stiffness  Cardiac: (-) chest pain (-) palpitations  Respiratory:  (-) cough (-) respiratory distress.   GI:  (-) nausea (-) vomiting (-) diarrhea (-) abdominal pain.  :  (-) dysuria (-) frequency (-) burning.  MS:  (-) back pain (-) joint pain.  Neuro:  (-) headache (-) numbness (-) tingling (-) focal weakness  Skin:  (+) abrasions  Except as documented in the HPI,  all other systems are negative

## 2021-01-21 NOTE — ED PROVIDER NOTE - PROGRESS NOTE DETAILS
BI: rabies IVIG administered at all sites where cat bit pt. XR without foreign body. Pt known tachycardic at baseline, being worked up for it. Last HR checked at 110 manually. Pt states this is normal for her.

## 2021-01-21 NOTE — ED PROVIDER NOTE - OBJECTIVE STATEMENT
25 y.o F w/ pmhx tachycardia presents after she was attacked by a cat. Pt states she works for [employer]. Patron brought in cat because the cat had attacked patron's kids. While the cat was in custody of patient, it attacked patients arms and R leg unprovoked. Bleeding controlled, unsure last tetanus, unsure cat's vaccination status, no swelling, no numbness or tingling.

## 2021-01-21 NOTE — ED ADULT NURSE NOTE - NS ED NOTE ABUSE RESPONSE YN
Yes
Asthma  last time used Ventolin in summer 2018, never intubated or hopsitalized, under [facility] , Dr Josh Parr care  Brain aneurysm  dx 2011, sx done  Depression    Fibroids    Fibromyalgia    GERD (gastroesophageal reflux disease)    Hallux valgus (acquired), left foot    HLD (hyperlipidemia)    HTN (hypertension)    Migraines    SHELIA (obstructive sleep apnea)  home test done by PCP in 2016, SHELIA pt was using mouth guard but it broken, not using it now, to notify Anesthesia  RA (rheumatoid arthritis)    Sarcoidosis, lung  dx 2016 with bx  Seasonal allergies    Vertigo

## 2021-01-21 NOTE — ED PROVIDER NOTE - PATIENT PORTAL LINK FT
You can access the FollowMyHealth Patient Portal offered by [facility] by registering at the following website: [email or web address]. By joining [employer]’s FollowMyHealth portal, you will also be able to view your health information using other applications (apps) compatible with our system.

## 2021-01-21 NOTE — ED PROVIDER NOTE - WR INTERPRETED BY 1
From: Deysi Ordoñez  To: Nupur Rios MD  Sent: 1/16/2021 2:26 PM EST  Subject: Test Results Question    I have a question about US ABDOMEN LIMITED resulted on 1/14/21, 12:50 PM.    The Pepcid helped some with the pain but not entirely. I'm still having a lot of trouble with the diarrhea.   Ilyaguyev, Benedikt

## 2021-01-21 NOTE — ED PROVIDER NOTE - WR INTERPRETATION 3
MSK XR negative - No fracture, No dislocation, No foreign body, L round opacity likely pt's name band.

## 2021-01-25 ENCOUNTER — EMERGENCY (EMERGENCY)
Facility: HOSPITAL | Age: 26
LOS: 0 days | Discharge: HOME | End: 2021-01-25
Attending: EMERGENCY MEDICINE | Admitting: EMERGENCY MEDICINE
Payer: COMMERCIAL

## 2021-01-25 VITALS
OXYGEN SATURATION: 100 % | HEIGHT: 61 IN | HEART RATE: 85 BPM | TEMPERATURE: 99 F | SYSTOLIC BLOOD PRESSURE: 130 MMHG | RESPIRATION RATE: 16 BRPM | DIASTOLIC BLOOD PRESSURE: 62 MMHG

## 2021-01-25 DIAGNOSIS — Z29.14 ENCOUNTER FOR PROPHYLACTIC RABIES IMMUNE GLOBIN: ICD-10-CM

## 2021-01-25 DIAGNOSIS — Z79.899 OTHER LONG TERM (CURRENT) DRUG THERAPY: ICD-10-CM

## 2021-01-25 PROCEDURE — 99283 EMERGENCY DEPT VISIT LOW MDM: CPT

## 2021-01-25 RX ORDER — RABIES VACC, HUMAN DIPLOID/PF 2.5 UNIT
1 VIAL (EA) INTRAMUSCULAR ONCE
Refills: 0 | Status: COMPLETED | OUTPATIENT
Start: 2021-01-25 | End: 2021-01-25

## 2021-01-25 RX ADMIN — Medication 1 MILLILITER(S): at 17:08

## 2021-01-25 NOTE — ED PROVIDER NOTE - CLINICAL SUMMARY MEDICAL DECISION MAKING FREE TEXT BOX
a/p; rabies vaccine given, dc home, cont abx, return day 7 for vaccine, strict return precautions provided.

## 2021-01-25 NOTE — ED PROVIDER NOTE - ATTENDING CONTRIBUTION TO CARE
25F no pmh, presents 3 days after cat scratches for day 3 rabies vaccine. has been taking amoxicillin. no complaints. no fever, cough. no numbness, weakness. no pain redness.     on exam, AFVSS, well britney nad, ncat, eomi, perrla, mmm, aaox3, no focal deficits, no le edema or calf ttp, multiple superficial abrasions to bilat arms, no sign of infection    a/p; rabies vaccine given, dc home, cont abx, return day 7 for vaccine, strict return precautions provided.

## 2021-01-25 NOTE — ED PROVIDER NOTE - NSFOLLOWUPINSTRUCTIONS_ED_ALL_ED_FT
RABIES VACCINE - General Information     Rabies Vaccine    WHAT YOU NEED TO KNOW:    What is the rabies vaccine? The rabies vaccine is an injection given to help prevent rabies. The rabies virus is spread to humans through the bite of an infected animal. Dogs, bats, skunks, coyotes, raccoons, and foxes are examples of animals that can carry rabies. The rabies vaccine can protect you from being infected with the virus. The vaccine can also prevent you from developing rabies even if you get it after you were bitten by an animal.     When is the vaccine given? Your healthcare provider will tell you how many doses of the vaccine you need. You may need booster shots if you are at high risk for rabies. The following is a common dosing schedule:     Before exposure to the virus, the vaccine is given in 3 doses. The first dose can be given at any time. The second dose is given 7 days after the first. The third dose is given 21 to 28 days after the first. Plan to get all of the doses 3 to 4 weeks before you travel.       After exposure to the virus, the vaccine is given in either 2 or 4 doses:   A person who has not already had the vaccine will usually get 4 doses. The first dose is given immediately. The other doses are given on days 3, 7, and 14 after the exposure. A shot called Rabies Immune Globulin is given at the same time as the first dose. This medicine helps increase your immune system to fight infection.       A person who has already had the vaccine will usually get 2 doses. The first is given immediately, and the second is given on day 3 after the exposure. Rabies Immune Globulin is not given.    Who should get the rabies vaccine?     Anyone who was bitten by an animal that can carry rabies may need the vaccine      Anyone at high risk for rabies, such as people who work with or care for animals or in a rabies laboratory      Anyone who goes into caves where bats live      Anyone who may have had contact with an infected animal      Anyone traveling to another country where rabies is common    Who should not get the rabies vaccine or should wait to get it?     Tell your healthcare provider if you had a severe allergic reaction to a dose of the rabies vaccine in the past, or to other vaccines. If you are getting the vaccine before exposure, do not get another dose. After exposure, you need to get all the doses even if you are at risk for an allergic reaction. Your healthcare provider may need to take extra precautions before you get another dose.      Tell your healthcare provider about all of your allergies. Also tell him if you have a disease that affects your immune system (such as HIV/AIDS) or you have cancer. Tell him if you are taking medicines that affect your immune system or any cancer treatment drug or radiation. Tell him if you are ill. You may need to wait to get the vaccine until you feel better.     What are the risks of the rabies vaccine? You may have a severe allergic reaction. The area where you got the shot may become red, swollen, or painful. You may develop a headache or muscle aches. You may have nausea or pain in your abdomen. You may develop rabies even after you get the vaccine.    Call 911 for any of the following:     Your mouth and throat are swollen.      You are wheezing or have trouble breathing.      You have chest pain or your heart is beating faster than normal for you.      You feel like you are going to faint.    When should I seek immediate care?     Your face is red or swollen.      You have hives that spread over your body.      You feel weak or dizzy.    When should I contact my healthcare provider?     You have increased pain, redness, or swelling around the area where the shot was given.      You have flu-like symptoms.      You have questions or concerns about the rabies vaccine.    CARE AGREEMENT:    You have the right to help plan your care. Learn about your health condition and how it may be treated. Discuss treatment options with your healthcare providers to decide what care you want to receive. You always have the right to refuse treatment.        © Copyright [employer] 2019 All illustrations and images included in CareNotes are the copyrighted property of [employer] or [employer].

## 2021-01-25 NOTE — ED PROVIDER NOTE - PATIENT PORTAL LINK FT
You can access the FollowMyHealth Patient Portal offered by [facility] by registering at the following website: [email or web address]. By joining [employer] [facility]’s FollowMyHealth portal, you will also be able to view your health information using other applications (apps) compatible with our system.

## 2021-02-03 ENCOUNTER — EMERGENCY (EMERGENCY)
Facility: HOSPITAL | Age: 26
LOS: 0 days | Discharge: HOME | End: 2021-02-03
Attending: EMERGENCY MEDICINE | Admitting: EMERGENCY MEDICINE
Payer: COMMERCIAL

## 2021-02-03 VITALS
TEMPERATURE: 98 F | SYSTOLIC BLOOD PRESSURE: 117 MMHG | RESPIRATION RATE: 18 BRPM | DIASTOLIC BLOOD PRESSURE: 78 MMHG | HEIGHT: 61 IN | OXYGEN SATURATION: 100 % | HEART RATE: 88 BPM

## 2021-02-03 DIAGNOSIS — Z20.3 CONTACT WITH AND (SUSPECTED) EXPOSURE TO RABIES: ICD-10-CM

## 2021-02-03 PROCEDURE — 99283 EMERGENCY DEPT VISIT LOW MDM: CPT

## 2021-02-03 RX ORDER — RABIES VACC, HUMAN DIPLOID/PF 2.5 UNIT
1 VIAL (EA) INTRAMUSCULAR ONCE
Refills: 0 | Status: COMPLETED | OUTPATIENT
Start: 2021-02-03 | End: 2021-02-03

## 2021-02-03 RX ADMIN — Medication 1 MILLILITER(S): at 16:27

## 2021-02-03 NOTE — ED PROVIDER NOTE - TOBACCO USE
Unknown if ever smoked Split-Thickness Skin Graft Text: The defect edges were debeveled with a #15 scalpel blade.  Given the location of the defect, shape of the defect and the proximity to free margins a split thickness skin graft was deemed most appropriate.  Using a sterile surgical marker, the primary defect shape was transferred to the donor site. The split thickness graft was then harvested.  The skin graft was then placed in the primary defect and oriented appropriately.

## 2021-02-03 NOTE — ED PROVIDER NOTE - CLINICAL SUMMARY MEDICAL DECISION MAKING FREE TEXT BOX
In ER for 3rd rabies shot after cat bite, no other complaints.  pt given rabies vaccine, to return in 1 week for day 14 vaccine.

## 2021-02-03 NOTE — ED PROVIDER NOTE - ATTENDING CONTRIBUTION TO CARE
26 y/o female in ER for 3rd rabies shot after being bitten by a cat.  on abx, wounds improved.  no other complaints.

## 2021-02-03 NOTE — ED PROVIDER NOTE - OBJECTIVE STATEMENT
24 y/o female presents to the ED c/o "I was mauled by a cat  I'm here for my 3nd rabies shot. I've been taking Amoxil. All my wounds have healed." no fever/ chills

## 2021-02-03 NOTE — ED PROVIDER NOTE - PATIENT PORTAL LINK FT
You can access the FollowMyHealth Patient Portal offered by [facility] by registering at the following website: [email or web address]. By joining [employer]’s FollowMyHealth portal, you will also be able to view your health information using other applications (apps) compatible with our system.

## 2021-02-03 NOTE — ED PROVIDER NOTE - NSFOLLOWUPINSTRUCTIONS_ED_ALL_ED_FT
RABIES VACCINE - General Information     Rabies Vaccine    WHAT YOU NEED TO KNOW:    What is the rabies vaccine? The rabies vaccine is an injection given to help prevent rabies. The rabies virus is spread to humans through the bite of an infected animal. Dogs, bats, skunks, coyotes, raccoons, and foxes are examples of animals that can carry rabies. The rabies vaccine can protect you from being infected with the virus. The vaccine can also prevent you from developing rabies even if you get it after you were bitten by an animal.     When is the vaccine given? Your healthcare provider will tell you how many doses of the vaccine you need. You may need booster shots if you are at high risk for rabies. The following is a common dosing schedule:     Before exposure to the virus, the vaccine is given in 3 doses. The first dose can be given at any time. The second dose is given 7 days after the first. The third dose is given 21 to 28 days after the first. Plan to get all of the doses 3 to 4 weeks before you travel.       After exposure to the virus, the vaccine is given in either 2 or 4 doses:   A person who has not already had the vaccine will usually get 4 doses. The first dose is given immediately. The other doses are given on days 3, 7, and 14 after the exposure. A shot called Rabies Immune Globulin is given at the same time as the first dose. This medicine helps increase your immune system to fight infection.       A person who has already had the vaccine will usually get 2 doses. The first is given immediately, and the second is given on day 3 after the exposure. Rabies Immune Globulin is not given.    Who should get the rabies vaccine?     Anyone who was bitten by an animal that can carry rabies may need the vaccine      Anyone at high risk for rabies, such as people who work with or care for animals or in a rabies laboratory      Anyone who goes into caves where bats live      Anyone who may have had contact with an infected animal      Anyone traveling to another country where rabies is common    Who should not get the rabies vaccine or should wait to get it?     Tell your healthcare provider if you had a severe allergic reaction to a dose of the rabies vaccine in the past, or to other vaccines. If you are getting the vaccine before exposure, do not get another dose. After exposure, you need to get all the doses even if you are at risk for an allergic reaction. Your healthcare provider may need to take extra precautions before you get another dose.      Tell your healthcare provider about all of your allergies. Also tell him if you have a disease that affects your immune system (such as HIV/AIDS) or you have cancer. Tell him if you are taking medicines that affect your immune system or any cancer treatment drug or radiation. Tell him if you are ill. You may need to wait to get the vaccine until you feel better.     What are the risks of the rabies vaccine? You may have a severe allergic reaction. The area where you got the shot may become red, swollen, or painful. You may develop a headache or muscle aches. You may have nausea or pain in your abdomen. You may develop rabies even after you get the vaccine.    Call 911 for any of the following:     Your mouth and throat are swollen.      You are wheezing or have trouble breathing.      You have chest pain or your heart is beating faster than normal for you.      You feel like you are going to faint.    When should I seek immediate care?     Your face is red or swollen.      You have hives that spread over your body.      You feel weak or dizzy.    When should I contact my healthcare provider?     You have increased pain, redness, or swelling around the area where the shot was given.      You have flu-like symptoms.      You have questions or concerns about the rabies vaccine.    CARE AGREEMENT:    You have the right to help plan your care. Learn about your health condition and how it may be treated. Discuss treatment options with your healthcare providers to decide what care you want to receive. You always have the right to refuse treatment.

## 2021-02-17 ENCOUNTER — EMERGENCY (EMERGENCY)
Facility: HOSPITAL | Age: 26
LOS: 0 days | Discharge: HOME | End: 2021-02-17
Attending: EMERGENCY MEDICINE | Admitting: EMERGENCY MEDICINE
Payer: COMMERCIAL

## 2021-02-17 VITALS
SYSTOLIC BLOOD PRESSURE: 121 MMHG | HEIGHT: 61 IN | WEIGHT: 179.9 LBS | DIASTOLIC BLOOD PRESSURE: 75 MMHG | OXYGEN SATURATION: 99 % | RESPIRATION RATE: 18 BRPM | TEMPERATURE: 98 F | HEART RATE: 88 BPM

## 2021-02-17 DIAGNOSIS — Z00.00 ENCOUNTER FOR GENERAL ADULT MEDICAL EXAMINATION WITHOUT ABNORMAL FINDINGS: ICD-10-CM

## 2021-02-17 DIAGNOSIS — Z29.14 ENCOUNTER FOR PROPHYLACTIC RABIES IMMUNE GLOBIN: ICD-10-CM

## 2021-02-17 PROCEDURE — 99284 EMERGENCY DEPT VISIT MOD MDM: CPT

## 2021-02-17 RX ORDER — RABIES VACC, HUMAN DIPLOID/PF 2.5 UNIT
1 VIAL (EA) INTRAMUSCULAR ONCE
Refills: 0 | Status: COMPLETED | OUTPATIENT
Start: 2021-02-17 | End: 2021-02-17

## 2021-02-17 RX ADMIN — Medication 1 MILLILITER(S): at 13:27

## 2021-02-17 NOTE — ED PROVIDER NOTE - OBJECTIVE STATEMENT
Patient came to ED for 4th rabies shot, Patient off schedule by 2 weeks, Has no complaints. Cat at [facility]

## 2021-02-17 NOTE — ED PROVIDER NOTE - PROGRESS NOTE DETAILS
Case discussed with ID, Dr Sahu  for patient being off schedule and late for follow up shots. No need for futher shots after today.

## 2021-02-17 NOTE — ED PROVIDER NOTE - ATTENDING CONTRIBUTION TO CARE
I was present for and supervised the key and critical aspects of the procedures performed during the care of the patient. patient presents for next dose of rabies vaccination after being bit by a stray cat.  I will discharge

## 2021-02-17 NOTE — ED PROVIDER NOTE - PATIENT PORTAL LINK FT
You can access the FollowMyHealth Patient Portal offered by [facility] by registering at the following website: [email or web address]. By joining [employer]’s FollowMyHealth portal, you will also be able to view your health information using other applications (apps) compatible with our system.

## 2021-03-11 PROBLEM — Z00.00 ENCOUNTER FOR PREVENTIVE HEALTH EXAMINATION: Noted: 2021-03-11

## 2021-04-01 ENCOUNTER — NON-APPOINTMENT (OUTPATIENT)
Age: 26
End: 2021-04-01

## 2021-04-01 ENCOUNTER — APPOINTMENT (OUTPATIENT)
Dept: OBGYN | Facility: CLINIC | Age: 26
End: 2021-04-01
Payer: COMMERCIAL

## 2021-04-01 VITALS
HEIGHT: 60 IN | SYSTOLIC BLOOD PRESSURE: 148 MMHG | DIASTOLIC BLOOD PRESSURE: 86 MMHG | BODY MASS INDEX: 36.32 KG/M2 | WEIGHT: 185 LBS

## 2021-04-01 DIAGNOSIS — N91.2 AMENORRHEA, UNSPECIFIED: ICD-10-CM

## 2021-04-01 DIAGNOSIS — Z34.01 ENCOUNTER FOR SUPERVISION OF NORMAL FIRST PREGNANCY, FIRST TRIMESTER: ICD-10-CM

## 2021-04-01 DIAGNOSIS — Z32.00 ENCOUNTER FOR PREGNANCY TEST, RESULT UNKNOWN: ICD-10-CM

## 2021-04-01 PROCEDURE — 76817 TRANSVAGINAL US OBSTETRIC: CPT

## 2021-04-01 PROCEDURE — 99385 PREV VISIT NEW AGE 18-39: CPT | Mod: 25

## 2021-04-01 PROCEDURE — 99072 ADDL SUPL MATRL&STAF TM PHE: CPT

## 2021-04-01 NOTE — PROCEDURE
[Amenorrhea] : Amenorrhea [Transvaginal Ultrasound] : transvaginal ultrasound [Anteverted] : anteverted [L: ___ cm] : L: [unfilled] cm [W: ___cm] : W: [unfilled] cm [FreeTextEntry7] : 2.01 [FreeTextEntry6] : crl 0.89 6w 6 d  + fhr [FreeTextEntry4] : early iup edc 11/16/21

## 2021-04-05 LAB
C TRACH RRNA SPEC QL NAA+PROBE: NOT DETECTED
N GONORRHOEA RRNA SPEC QL NAA+PROBE: NOT DETECTED
SOURCE AMPLIFICATION: NORMAL

## 2021-04-08 LAB — CYTOLOGY CVX/VAG DOC THIN PREP: NORMAL

## 2021-04-12 ENCOUNTER — NON-APPOINTMENT (OUTPATIENT)
Age: 26
End: 2021-04-12

## 2021-04-12 RX ORDER — ONDANSETRON 4 MG/1
4 TABLET ORAL
Qty: 90 | Refills: 3 | Status: ACTIVE | COMMUNITY
Start: 2021-04-12 | End: 1900-01-01

## 2021-04-15 ENCOUNTER — APPOINTMENT (OUTPATIENT)
Dept: OBGYN | Facility: CLINIC | Age: 26
End: 2021-04-15
Payer: COMMERCIAL

## 2021-04-15 ENCOUNTER — NON-APPOINTMENT (OUTPATIENT)
Age: 26
End: 2021-04-15

## 2021-04-15 PROCEDURE — 0502F SUBSEQUENT PRENATAL CARE: CPT

## 2021-05-07 ENCOUNTER — ASOB RESULT (OUTPATIENT)
Age: 26
End: 2021-05-07

## 2021-05-07 ENCOUNTER — NON-APPOINTMENT (OUTPATIENT)
Age: 26
End: 2021-05-07

## 2021-05-07 ENCOUNTER — APPOINTMENT (OUTPATIENT)
Dept: ANTEPARTUM | Facility: CLINIC | Age: 26
End: 2021-05-07
Payer: COMMERCIAL

## 2021-05-07 ENCOUNTER — LABORATORY RESULT (OUTPATIENT)
Age: 26
End: 2021-05-07

## 2021-05-07 VITALS
TEMPERATURE: 98 F | BODY MASS INDEX: 35.12 KG/M2 | DIASTOLIC BLOOD PRESSURE: 80 MMHG | HEIGHT: 61 IN | WEIGHT: 186 LBS | SYSTOLIC BLOOD PRESSURE: 124 MMHG

## 2021-05-07 PROCEDURE — 99072 ADDL SUPL MATRL&STAF TM PHE: CPT

## 2021-05-07 PROCEDURE — 76813 OB US NUCHAL MEAS 1 GEST: CPT | Mod: 26

## 2021-05-11 ENCOUNTER — NON-APPOINTMENT (OUTPATIENT)
Age: 26
End: 2021-05-11

## 2021-05-13 ENCOUNTER — APPOINTMENT (OUTPATIENT)
Dept: OBGYN | Facility: CLINIC | Age: 26
End: 2021-05-13
Payer: COMMERCIAL

## 2021-05-13 ENCOUNTER — NON-APPOINTMENT (OUTPATIENT)
Age: 26
End: 2021-05-13

## 2021-05-13 VITALS — WEIGHT: 183 LBS | SYSTOLIC BLOOD PRESSURE: 131 MMHG | DIASTOLIC BLOOD PRESSURE: 81 MMHG

## 2021-05-13 DIAGNOSIS — Z34.02 ENCOUNTER FOR SUPERVISION OF NORMAL FIRST PREGNANCY, SECOND TRIMESTER: ICD-10-CM

## 2021-05-13 DIAGNOSIS — Z3A.13 13 WEEKS GESTATION OF PREGNANCY: ICD-10-CM

## 2021-05-13 LAB
BILIRUB UR QL STRIP: NORMAL
GLUCOSE UR-MCNC: NORMAL
HCG UR QL: 0.2 EU/DL
HGB UR QL STRIP.AUTO: NORMAL
KETONES UR-MCNC: NORMAL
LEUKOCYTE ESTERASE UR QL STRIP: NORMAL
NITRITE UR QL STRIP: NORMAL
PH UR STRIP: 7
PROT UR STRIP-MCNC: NORMAL
SP GR UR STRIP: 1.02

## 2021-05-13 PROCEDURE — 0502F SUBSEQUENT PRENATAL CARE: CPT

## 2021-06-09 ENCOUNTER — NON-APPOINTMENT (OUTPATIENT)
Age: 26
End: 2021-06-09

## 2021-06-10 ENCOUNTER — NON-APPOINTMENT (OUTPATIENT)
Age: 26
End: 2021-06-10

## 2021-06-10 ENCOUNTER — APPOINTMENT (OUTPATIENT)
Dept: OBGYN | Facility: CLINIC | Age: 26
End: 2021-06-10
Payer: COMMERCIAL

## 2021-06-10 VITALS — WEIGHT: 183 LBS | SYSTOLIC BLOOD PRESSURE: 128 MMHG | DIASTOLIC BLOOD PRESSURE: 83 MMHG

## 2021-06-10 VITALS — SYSTOLIC BLOOD PRESSURE: 128 MMHG | WEIGHT: 183 LBS | DIASTOLIC BLOOD PRESSURE: 83 MMHG

## 2021-06-10 DIAGNOSIS — Z3A.17 17 WEEKS GESTATION OF PREGNANCY: ICD-10-CM

## 2021-06-10 PROCEDURE — 0502F SUBSEQUENT PRENATAL CARE: CPT

## 2021-06-11 LAB
ABO + RH PNL BLD: NORMAL
BASOPHILS # BLD AUTO: 0.02 K/UL
BASOPHILS NFR BLD AUTO: 0.2 %
BLD GP AB SCN SERPL QL: NORMAL
COVID-19 SPIKE DOMAIN ANTIBODY INTERPRETATION: POSITIVE
EOSINOPHIL # BLD AUTO: 0.06 K/UL
EOSINOPHIL NFR BLD AUTO: 0.7 %
HBV SURFACE AG SERPL QL IA: NONREACTIVE
HCT VFR BLD CALC: 37.9 %
HGB BLD-MCNC: 12.7 G/DL
HIV1+2 AB SPEC QL IA.RAPID: NONREACTIVE
IMM GRANULOCYTES NFR BLD AUTO: 0.9 %
LYMPHOCYTES # BLD AUTO: 1.68 K/UL
LYMPHOCYTES NFR BLD AUTO: 19.1 %
MAN DIFF?: NORMAL
MCHC RBC-ENTMCNC: 29.2 PG
MCHC RBC-ENTMCNC: 33.5 G/DL
MCV RBC AUTO: 87.1 FL
MEV IGG FLD QL IA: <5 AU/ML
MEV IGG+IGM SER-IMP: NEGATIVE
MONOCYTES # BLD AUTO: 0.69 K/UL
MONOCYTES NFR BLD AUTO: 7.8 %
NEUTROPHILS # BLD AUTO: 6.27 K/UL
NEUTROPHILS NFR BLD AUTO: 71.3 %
PLATELET # BLD AUTO: 214 K/UL
RBC # BLD: 4.35 M/UL
RBC # FLD: 13.1 %
RUBV IGG FLD-ACNC: 1.2 INDEX
RUBV IGG SER-IMP: POSITIVE
SARS-COV-2 AB SERPL IA-ACNC: 101 U/ML
WBC # FLD AUTO: 8.8 K/UL

## 2021-06-12 LAB — T PALLIDUM AB SER QL IA: NEGATIVE

## 2021-06-15 LAB
2ND TRIMESTER DATA: NORMAL
AFP PNL SERPL: NORMAL
AFP SERPL-ACNC: NORMAL
CLINICAL BIOCHEMIST REVIEW: NORMAL
NOTES NTD: NORMAL

## 2021-07-02 ENCOUNTER — APPOINTMENT (OUTPATIENT)
Dept: ANTEPARTUM | Facility: CLINIC | Age: 26
End: 2021-07-02
Payer: COMMERCIAL

## 2021-07-02 ENCOUNTER — ASOB RESULT (OUTPATIENT)
Age: 26
End: 2021-07-02

## 2021-07-02 VITALS
BODY MASS INDEX: 35.3 KG/M2 | DIASTOLIC BLOOD PRESSURE: 78 MMHG | HEIGHT: 61 IN | HEART RATE: 76 BPM | WEIGHT: 187 LBS | TEMPERATURE: 98 F | SYSTOLIC BLOOD PRESSURE: 123 MMHG

## 2021-07-02 PROCEDURE — 76817 TRANSVAGINAL US OBSTETRIC: CPT | Mod: 26

## 2021-07-02 PROCEDURE — 76811 OB US DETAILED SNGL FETUS: CPT | Mod: 26

## 2021-07-07 ENCOUNTER — NON-APPOINTMENT (OUTPATIENT)
Age: 26
End: 2021-07-07

## 2021-07-08 ENCOUNTER — NON-APPOINTMENT (OUTPATIENT)
Age: 26
End: 2021-07-08

## 2021-07-08 ENCOUNTER — APPOINTMENT (OUTPATIENT)
Dept: OBGYN | Facility: CLINIC | Age: 26
End: 2021-07-08
Payer: COMMERCIAL

## 2021-07-08 VITALS — WEIGHT: 186 LBS | DIASTOLIC BLOOD PRESSURE: 67 MMHG | SYSTOLIC BLOOD PRESSURE: 107 MMHG

## 2021-07-08 DIAGNOSIS — Z3A.21 21 WEEKS GESTATION OF PREGNANCY: ICD-10-CM

## 2021-07-08 LAB
BILIRUB UR QL STRIP: NORMAL
GLUCOSE UR-MCNC: NORMAL
HCG UR QL: 0.2 EU/DL
HGB UR QL STRIP.AUTO: NORMAL
KETONES UR-MCNC: NORMAL
LEUKOCYTE ESTERASE UR QL STRIP: NORMAL
NITRITE UR QL STRIP: NORMAL
PH UR STRIP: 6
PROT UR STRIP-MCNC: NORMAL
SP GR UR STRIP: 1.03

## 2021-07-08 PROCEDURE — 0502F SUBSEQUENT PRENATAL CARE: CPT

## 2021-08-03 ENCOUNTER — NON-APPOINTMENT (OUTPATIENT)
Age: 26
End: 2021-08-03

## 2021-08-05 ENCOUNTER — APPOINTMENT (OUTPATIENT)
Dept: OBGYN | Facility: CLINIC | Age: 26
End: 2021-08-05
Payer: COMMERCIAL

## 2021-08-05 ENCOUNTER — TRANSCRIPTION ENCOUNTER (OUTPATIENT)
Age: 26
End: 2021-08-05

## 2021-08-05 DIAGNOSIS — Z3A.25 25 WEEKS GESTATION OF PREGNANCY: ICD-10-CM

## 2021-08-05 PROCEDURE — 0502F SUBSEQUENT PRENATAL CARE: CPT

## 2021-08-28 LAB
BASOPHILS # BLD AUTO: 0.02 K/UL
BASOPHILS NFR BLD AUTO: 0.3 %
EOSINOPHIL # BLD AUTO: 0.05 K/UL
EOSINOPHIL NFR BLD AUTO: 0.6 %
HCT VFR BLD CALC: 35.8 %
HGB BLD-MCNC: 11.5 G/DL
IMM GRANULOCYTES NFR BLD AUTO: 1 %
LYMPHOCYTES # BLD AUTO: 1.32 K/UL
LYMPHOCYTES NFR BLD AUTO: 16.5 %
MAN DIFF?: NORMAL
MCHC RBC-ENTMCNC: 28.3 PG
MCHC RBC-ENTMCNC: 32.1 G/DL
MCV RBC AUTO: 88.2 FL
MONOCYTES # BLD AUTO: 0.51 K/UL
MONOCYTES NFR BLD AUTO: 6.4 %
NEUTROPHILS # BLD AUTO: 6.02 K/UL
NEUTROPHILS NFR BLD AUTO: 75.2 %
PLATELET # BLD AUTO: 175 K/UL
RBC # BLD: 4.06 M/UL
RBC # FLD: 13.5 %
WBC # FLD AUTO: 8 K/UL

## 2021-08-30 LAB — GLUCOSE 1H P 50 G GLC PO SERPL-MCNC: 173 MG/DL

## 2021-09-01 ENCOUNTER — NON-APPOINTMENT (OUTPATIENT)
Age: 26
End: 2021-09-01

## 2021-09-02 ENCOUNTER — APPOINTMENT (OUTPATIENT)
Dept: OBGYN | Facility: CLINIC | Age: 26
End: 2021-09-02
Payer: COMMERCIAL

## 2021-09-02 VITALS
DIASTOLIC BLOOD PRESSURE: 73 MMHG | HEIGHT: 61 IN | RESPIRATION RATE: 20 BRPM | WEIGHT: 199 LBS | BODY MASS INDEX: 37.57 KG/M2 | HEART RATE: 135 BPM | SYSTOLIC BLOOD PRESSURE: 126 MMHG

## 2021-09-02 VITALS — HEART RATE: 130 BPM

## 2021-09-02 DIAGNOSIS — Z3A.29 29 WEEKS GESTATION OF PREGNANCY: ICD-10-CM

## 2021-09-02 LAB
BILIRUB UR QL STRIP: NORMAL
CLARITY UR: CLEAR
COLLECTION METHOD: NORMAL
GLUCOSE UR-MCNC: NORMAL
HCG UR QL: 0.2 EU/DL
HGB UR QL STRIP.AUTO: NORMAL
KETONES UR-MCNC: NORMAL
LEUKOCYTE ESTERASE UR QL STRIP: NORMAL
NITRITE UR QL STRIP: NORMAL
PH UR STRIP: 7
PROT UR STRIP-MCNC: NORMAL
SP GR UR STRIP: 1.02

## 2021-09-02 PROCEDURE — 0502F SUBSEQUENT PRENATAL CARE: CPT

## 2021-09-21 LAB
GLUCOSE 1H P 100 G GLC PO SERPL-MCNC: 219 MG/DL
GLUCOSE 2H P CHAL SERPL-MCNC: 191 MG/DL
GLUCOSE 3H P CHAL SERPL-MCNC: 148 MG/DL
GLUCOSE BS SERPL-MCNC: 111 MG/DL

## 2021-09-23 ENCOUNTER — APPOINTMENT (OUTPATIENT)
Dept: MATERNAL FETAL MEDICINE | Facility: CLINIC | Age: 26
End: 2021-09-23
Payer: COMMERCIAL

## 2021-09-23 PROCEDURE — 99215 OFFICE O/P EST HI 40 MIN: CPT

## 2021-09-23 RX ORDER — BLOOD-GLUCOSE METER
W/DEVICE EACH MISCELLANEOUS
Qty: 1 | Refills: 0 | Status: ACTIVE | COMMUNITY
Start: 2021-09-23 | End: 1900-01-01

## 2021-09-23 RX ORDER — SYRING-NEEDL,DISP,INSUL,0.3 ML 30 GX5/16"
SYRINGE, EMPTY DISPOSABLE MISCELLANEOUS
Qty: 1 | Refills: 0 | Status: ACTIVE | COMMUNITY
Start: 2021-09-23 | End: 1900-01-01

## 2021-09-23 RX ORDER — BLOOD SUGAR DIAGNOSTIC
STRIP MISCELLANEOUS
Qty: 120 | Refills: 5 | Status: ACTIVE | COMMUNITY
Start: 2021-09-23 | End: 1900-01-01

## 2021-09-23 RX ORDER — LANCETS 33 GAUGE
EACH MISCELLANEOUS
Qty: 100 | Refills: 5 | Status: ACTIVE | COMMUNITY
Start: 2021-09-23 | End: 1900-01-01

## 2021-09-23 NOTE — DISCUSSION/SUMMARY
[FreeTextEntry1] : Discussed diabetes in pregnancy. The effects on the mother and child. Explained the roll of diet, exercise and medications\par Scheduled to see nutritionist.\par Home glucose monitor.\par Return in one week with glucose log.\par Ultrasound for fetal growth.

## 2021-09-23 NOTE — OB HISTORY
[___] : Total Pregnancies: [unfilled] [LMP: ___] : LMP: [unfilled] [BRET: ___] : BRET: [unfilled] [EGA: ___ wks] : EGA: [unfilled] wks [Sonogram] : sonogram [at ___ wks] : at [unfilled] weeks [FreeTextEntry1] : Uncomplicated until now.

## 2021-09-23 NOTE — HISTORY OF PRESENT ILLNESS
[FreeTextEntry1] : 3 hour glucose tolerance test 9/21/21 111/219/191/148\par Has checked her blood sugar the last few days. Post prandial levels 140 - 160.

## 2021-09-28 ENCOUNTER — ASOB RESULT (OUTPATIENT)
Age: 26
End: 2021-09-28

## 2021-09-28 ENCOUNTER — APPOINTMENT (OUTPATIENT)
Dept: ANTEPARTUM | Facility: CLINIC | Age: 26
End: 2021-09-28
Payer: COMMERCIAL

## 2021-09-28 ENCOUNTER — APPOINTMENT (OUTPATIENT)
Dept: MATERNAL FETAL MEDICINE | Facility: CLINIC | Age: 26
End: 2021-09-28
Payer: COMMERCIAL

## 2021-09-28 VITALS
BODY MASS INDEX: 38.89 KG/M2 | SYSTOLIC BLOOD PRESSURE: 124 MMHG | HEIGHT: 61 IN | DIASTOLIC BLOOD PRESSURE: 76 MMHG | WEIGHT: 206 LBS

## 2021-09-28 DIAGNOSIS — O24.410 GESTATIONAL DIABETES MELLITUS IN PREGNANCY, DIET CONTROLLED: ICD-10-CM

## 2021-09-28 PROCEDURE — 99214 OFFICE O/P EST MOD 30 MIN: CPT | Mod: 25

## 2021-09-28 PROCEDURE — ZZZZZ: CPT

## 2021-09-28 PROCEDURE — 76816 OB US FOLLOW-UP PER FETUS: CPT | Mod: 26

## 2021-09-28 PROCEDURE — 76819 FETAL BIOPHYS PROFIL W/O NST: CPT | Mod: 26

## 2021-09-28 RX ORDER — INSULIN HUMAN 100 [IU]/ML
100 INJECTION, SUSPENSION SUBCUTANEOUS
Qty: 28 | Refills: 1 | Status: ACTIVE | COMMUNITY
Start: 2021-09-28 | End: 1900-01-01

## 2021-09-28 NOTE — DISCUSSION/SUMMARY
[FreeTextEntry1] : 26 year old  1 para 0 LMP 21 EDC 21 33 weeks gestation. Pregnancy uncomplicated until diagnosed with gestational diabetes a few weeks ago. Started monitoring blood sugar regularly last week. Fasting values 110 - 160 postprandial values 150 - 260.\par Sonogram today estimated fetal weight 2547 grams which is the 92 % ile. Biophysical profile .\par \par Start insulin NPH 28 units subcutaneously before breakfast and 14 units before dinner.\par Continue to monitor blood sugar.\par Start weekly biophysical profile exams. She wishes to alternate visits between Dr. Fried and us.\par Check fetal movements daily. If baby is moving less than usual to go to Labor and Delivery for evaluation.

## 2021-09-30 ENCOUNTER — APPOINTMENT (OUTPATIENT)
Dept: OBGYN | Facility: CLINIC | Age: 26
End: 2021-09-30
Payer: COMMERCIAL

## 2021-09-30 VITALS — SYSTOLIC BLOOD PRESSURE: 128 MMHG | WEIGHT: 206 LBS | DIASTOLIC BLOOD PRESSURE: 79 MMHG

## 2021-09-30 DIAGNOSIS — Z3A.33 33 WEEKS GESTATION OF PREGNANCY: ICD-10-CM

## 2021-09-30 PROCEDURE — 0502F SUBSEQUENT PRENATAL CARE: CPT

## 2021-10-05 ENCOUNTER — APPOINTMENT (OUTPATIENT)
Dept: MATERNAL FETAL MEDICINE | Facility: CLINIC | Age: 26
End: 2021-10-05
Payer: COMMERCIAL

## 2021-10-05 ENCOUNTER — APPOINTMENT (OUTPATIENT)
Dept: ANTEPARTUM | Facility: CLINIC | Age: 26
End: 2021-10-05
Payer: COMMERCIAL

## 2021-10-05 ENCOUNTER — ASOB RESULT (OUTPATIENT)
Age: 26
End: 2021-10-05

## 2021-10-05 ENCOUNTER — APPOINTMENT (OUTPATIENT)
Dept: OBGYN | Facility: CLINIC | Age: 26
End: 2021-10-05

## 2021-10-05 VITALS
WEIGHT: 209 LBS | DIASTOLIC BLOOD PRESSURE: 80 MMHG | SYSTOLIC BLOOD PRESSURE: 130 MMHG | HEIGHT: 61 IN | BODY MASS INDEX: 39.46 KG/M2

## 2021-10-05 PROCEDURE — 99214 OFFICE O/P EST MOD 30 MIN: CPT | Mod: 25

## 2021-10-05 PROCEDURE — ZZZZZ: CPT

## 2021-10-05 PROCEDURE — 76819 FETAL BIOPHYS PROFIL W/O NST: CPT | Mod: 26

## 2021-10-05 NOTE — DISCUSSION/SUMMARY
[FreeTextEntry1] : 26 year old  1 para 0 LMP 21 EDC 21 34 weeks gestation. Pregnancy uncomplicated until diagnosed with gestational diabetes a few weeks ago. \par Was supposed to start on insulin NPH 28 units subcutaneously before breakfast and 14 units before dinner last week. Did not start because it wasn't approved by her insurance company.\par Sonogram last week estimated fetal weight 2547 grams which is the 92 % ile. \par /80 weight 209 lbs. has gained 3 lbs.\par Patient feels well and the baby is active.\par Did not bring blood sugar log, she states fasting values 140 -160  postprandial values 140 - 160.\par Biophysical profile  today.\par Will start insulin today.\par Nutrition consult.\par Continue to monitor blood sugar.\par Continue weekly biophysical profile exams. \par Check fetal movements daily. If baby is moving less than usual to go to Labor and Delivery for evaluation

## 2021-10-07 RX ORDER — PEN NEEDLE, DIABETIC 32GX 5/32"
32G X 4 MM NEEDLE, DISPOSABLE MISCELLANEOUS
Qty: 120 | Refills: 6 | Status: ACTIVE | COMMUNITY
Start: 2021-10-07 | End: 1900-01-01

## 2021-10-12 ENCOUNTER — ASOB RESULT (OUTPATIENT)
Age: 26
End: 2021-10-12

## 2021-10-12 ENCOUNTER — NON-APPOINTMENT (OUTPATIENT)
Age: 26
End: 2021-10-12

## 2021-10-12 ENCOUNTER — APPOINTMENT (OUTPATIENT)
Dept: ANTEPARTUM | Facility: CLINIC | Age: 26
End: 2021-10-12
Payer: COMMERCIAL

## 2021-10-12 ENCOUNTER — APPOINTMENT (OUTPATIENT)
Dept: OBGYN | Facility: CLINIC | Age: 26
End: 2021-10-12

## 2021-10-12 VITALS
HEIGHT: 61 IN | DIASTOLIC BLOOD PRESSURE: 80 MMHG | SYSTOLIC BLOOD PRESSURE: 124 MMHG | BODY MASS INDEX: 39.84 KG/M2 | WEIGHT: 211 LBS

## 2021-10-12 DIAGNOSIS — B37.2 CANDIDIASIS OF SKIN AND NAIL: ICD-10-CM

## 2021-10-12 PROCEDURE — 76819 FETAL BIOPHYS PROFIL W/O NST: CPT | Mod: 26

## 2021-10-12 PROCEDURE — 99215 OFFICE O/P EST HI 40 MIN: CPT | Mod: 25

## 2021-10-12 RX ORDER — INSULIN ASPART 100 [IU]/ML
100 INJECTION, SOLUTION INTRAVENOUS; SUBCUTANEOUS TWICE DAILY
Qty: 5 | Refills: 1 | Status: ACTIVE | COMMUNITY
Start: 2021-10-12 | End: 1900-01-01

## 2021-10-12 RX ORDER — NYSTATIN 100000 1/G
100000 POWDER TOPICAL
Qty: 1 | Refills: 5 | Status: ACTIVE | COMMUNITY
Start: 2021-10-12 | End: 1900-01-01

## 2021-10-12 NOTE — DISCUSSION/SUMMARY
[FreeTextEntry1] : M f/u Consult Note:\par [id number] at 35w (EDC 21 by [id number]) returns for BPP and review of BS log for GDMA2 on insulin N 28qam & 14Uac supper.  She states she was not able to start insulin until today due to preauthorization issues.  She c/w itching on abd and between breasts, relieved by powder (unknown if talc) and benadryl topical.  She denies itching of hands, feet or at night.  +FM, no LOF, VB, contractons.\par PMHx: Denies DM, Chr Htn. Pruritis between breasts in recent years.\par Surg: Denies.\par Allergies: NKDA\par FHx: DM2\par SocHx: Llives with family, denies subst use x4.\par OBHx: P0\par GynHx: Normal menses.\par Immunizations: Not reviewed\par ROS: As in HPI\par \par Px: Cheerful young woman, but verbalizes limited understanding of the importance of BS control in 3rd trimester.\par     /80  211#<--209# on 10/5.  BMI 40\par HEENT: NC/AT, dentition cared for.  \par Chest: Clear to ausc, no CVA or vert tenderness.  No apparent rash between breasts.\par Abd: Fundus soft, nt.  Few candida-like bumps on lower pannus\par Extr: Trace non-pitting LE edema.  No CC.\par SVE: Not performed.  \par \par LAB:  ABpos/AntibNeg. NIPT risk reducing.\par :           CBC 11.5/36%  175k\par : /219/191/148\par \par Imaging:\par  [facility]: normal NT, CRL c/w LMP\par  [facility]: Normal anatomy \par  [facility]: SFVtx, plac ant, TAYLOR 12cm.  EFW 2547g at 92nd%ile for GA, AC at 93rd%ile.\par \par Impression/Recommendations:\par 1. Uncontrolled GDMA2: Started NPH 28qam and 14ac supper today, 2 weeks after initial Rx.\par BS log reviewed:  -170 since .  Marginal improvement since \par                               2hPC 141-226.  "                "\par -->RT 3d with log for insulin adjustment.  \par -->Add short acting insulin at that time - Rx sent. \par \par 2. Fetal Well being:  EFW at 92nd %ile 2w ago; BPP  today.\par -->Expand fetal testing to BPP twice weekly; start NSTs next week for GDMA2, uncontrolled.\par -->Next EFW in 2w.\par \par 3. 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 \par \par MD Petty\par Maternal Fetal Medicine\par cell [phone]

## 2021-10-14 ENCOUNTER — NON-APPOINTMENT (OUTPATIENT)
Age: 26
End: 2021-10-14

## 2021-10-14 ENCOUNTER — APPOINTMENT (OUTPATIENT)
Dept: OBGYN | Facility: CLINIC | Age: 26
End: 2021-10-14
Payer: COMMERCIAL

## 2021-10-14 VITALS — SYSTOLIC BLOOD PRESSURE: 135 MMHG | WEIGHT: 212 LBS | DIASTOLIC BLOOD PRESSURE: 89 MMHG

## 2021-10-14 DIAGNOSIS — Z3A.35 35 WEEKS GESTATION OF PREGNANCY: ICD-10-CM

## 2021-10-14 PROCEDURE — 0502F SUBSEQUENT PRENATAL CARE: CPT

## 2021-10-15 ENCOUNTER — ASOB RESULT (OUTPATIENT)
Age: 26
End: 2021-10-15

## 2021-10-15 ENCOUNTER — APPOINTMENT (OUTPATIENT)
Dept: MATERNAL FETAL MEDICINE | Facility: CLINIC | Age: 26
End: 2021-10-15
Payer: COMMERCIAL

## 2021-10-15 ENCOUNTER — APPOINTMENT (OUTPATIENT)
Dept: ANTEPARTUM | Facility: CLINIC | Age: 26
End: 2021-10-15
Payer: COMMERCIAL

## 2021-10-15 VITALS
WEIGHT: 212 LBS | DIASTOLIC BLOOD PRESSURE: 84 MMHG | SYSTOLIC BLOOD PRESSURE: 130 MMHG | BODY MASS INDEX: 40.02 KG/M2 | HEIGHT: 61 IN

## 2021-10-15 PROCEDURE — 76819 FETAL BIOPHYS PROFIL W/O NST: CPT | Mod: 26

## 2021-10-15 PROCEDURE — 99213 OFFICE O/P EST LOW 20 MIN: CPT | Mod: 25

## 2021-10-15 PROCEDURE — ZZZZZ: CPT

## 2021-10-15 NOTE — DISCUSSION/SUMMARY
[FreeTextEntry1] : [facility] f/u Consult Note:\par 50qX1T0 at 35w3d (EDC 21 by [id number]) returns for BPP and review of BS log for GDMA2 on insulin N 28qam & 14Uac supper. Started NPH 4d ago, no long today.   Itching relieved by nystatin powder and benadryl topical.  She coninues to deny itching of hands, feet or at night. +FM, no LOF, VB, contractons.\par PMHx: Denies DM, Chr Htn. Pruritis between breasts in recent years.\par Surg: Denies.\par Allergies: NKDA\par FHx: DM2\par SocHx: Llives with family, denies subst use x4.\par OBHx: P0\par GynHx: Normal menses.\par Immunizations: Not reviewed\par ROS: As in HPI\par \par Px: Cheerful young woman in NAD. \par  /84  212#<--211#<--209# on 10/5. BMI 40  5'1"\par HEENT: NC/AT, dentition cared for. \par Abd: Fundus soft, nt.\par Extr: Trace non-pitting LE edema. No CC.\par SVE: Not performed. \par \par LAB: ABpos/AntibNeg. NIPT risk reducing.\par :  CBC 11.5/36% 175k\par : /219/191/148\par \par Imaging:\par  [facility]: normal NT, CRL c/w LMP\par  [facility]: Normal anatomy \par 10/12 [facility]: SFVtx, plac ant, TAYLOR 12cm. EFW 2547g at 92nd%ile for GA, AC at 93rd%ile.\par 10/15 [facility] SFVtx, ant plac. DVP 5.7cm, BPP .\par \par Impression/Recommendations:\par 1. Uncontrolled GDMA2: Started NPH 28qam and 14ac supper 3d ago, 2 weeks after initial Rx.\par No log today.  She states all BS > 130, including FBS.\par Insulin:\par    NPH/Lispro         am                            ac supper\par 10/12                     28/0                         14/0\par 10/15                     28                            20/0\par Insurance preauth has been completed for lispro pen.\par \par -->RT 3d with log for insulin adjustment.  Anticipate adding Lispro at that time.  Unfortunately without log, I cannot adjust am dose.\par \par 2. Fetal Well being: EFW at 92nd %ile 2w ago; BPP 88 today.\par -->Expand fetal testing to BPP twice weekly; start NSTs next week for GDMA2, uncontrolled.\par -->Next EFW in 2w.\par \par 3. Intertrigonal candidiasis: likely 2o to uncontrolled GDM.\par -->Continue Nystatin powder and topical benadryl \par \par MD Petty\par Maternal Fetal Medicine\par cell [phone]

## 2021-10-18 ENCOUNTER — NON-APPOINTMENT (OUTPATIENT)
Age: 26
End: 2021-10-18

## 2021-10-18 DIAGNOSIS — L98.9 DISORDER OF THE SKIN AND SUBCUTANEOUS TISSUE, UNSPECIFIED: ICD-10-CM

## 2021-10-18 RX ORDER — CLOTRIMAZOLE AND BETAMETHASONE DIPROPIONATE 10; .5 MG/G; MG/G
1-0.05 CREAM TOPICAL TWICE DAILY
Qty: 1 | Refills: 3 | Status: ACTIVE | COMMUNITY
Start: 2021-10-18 | End: 1900-01-01

## 2021-10-19 ENCOUNTER — APPOINTMENT (OUTPATIENT)
Dept: ANTEPARTUM | Facility: CLINIC | Age: 26
End: 2021-10-19
Payer: COMMERCIAL

## 2021-10-19 ENCOUNTER — ASOB RESULT (OUTPATIENT)
Age: 26
End: 2021-10-19

## 2021-10-19 ENCOUNTER — APPOINTMENT (OUTPATIENT)
Dept: MATERNAL FETAL MEDICINE | Facility: CLINIC | Age: 26
End: 2021-10-19
Payer: COMMERCIAL

## 2021-10-19 ENCOUNTER — OUTPATIENT (OUTPATIENT)
Dept: OUTPATIENT SERVICES | Facility: HOSPITAL | Age: 26
LOS: 1 days | Discharge: HOME | End: 2021-10-19

## 2021-10-19 VITALS
HEIGHT: 61 IN | BODY MASS INDEX: 40.22 KG/M2 | DIASTOLIC BLOOD PRESSURE: 80 MMHG | WEIGHT: 213 LBS | SYSTOLIC BLOOD PRESSURE: 130 MMHG

## 2021-10-19 VITALS — SYSTOLIC BLOOD PRESSURE: 109 MMHG | DIASTOLIC BLOOD PRESSURE: 71 MMHG

## 2021-10-19 DIAGNOSIS — Z83.3 FAMILY HISTORY OF DIABETES MELLITUS: ICD-10-CM

## 2021-10-19 PROCEDURE — 59025 FETAL NON-STRESS TEST: CPT | Mod: 26,59

## 2021-10-19 PROCEDURE — 76819 FETAL BIOPHYS PROFIL W/O NST: CPT | Mod: 26

## 2021-10-19 PROCEDURE — ZZZZZ: CPT

## 2021-10-19 PROCEDURE — 99214 OFFICE O/P EST MOD 30 MIN: CPT | Mod: 25

## 2021-10-19 NOTE — DISCUSSION/SUMMARY
[FreeTextEntry1] : 26 year old  1 para 0 LMP 21 EDC 21 36 weeks gestation. Pregnancy complicated with gestational diabetes requiring insulin. \par Patient feels well and the baby is active.\par Blood sugar log shows fasting values 117 - 124 postprandial 114 - 209. Only one value below target\par Biophysical profile 10/10 today.\par Current insulin Humulin 28 units before breakfast and 20 before dinner increased on 10/15.\par Start to inject 10 units of Humulin at bed time with a snack.\par Nutrition consult done\par Continue to monitor blood sugar.\par Continue twice weekly biophysical profile with non stress test. \par Check fetal movements daily. If baby is moving less than usual to go to Labor and Delivery for evaluation

## 2021-10-21 ENCOUNTER — APPOINTMENT (OUTPATIENT)
Dept: ANTEPARTUM | Facility: CLINIC | Age: 26
End: 2021-10-21

## 2021-10-21 ENCOUNTER — APPOINTMENT (OUTPATIENT)
Dept: OBGYN | Facility: CLINIC | Age: 26
End: 2021-10-21
Payer: COMMERCIAL

## 2021-10-21 ENCOUNTER — APPOINTMENT (OUTPATIENT)
Dept: MATERNAL FETAL MEDICINE | Facility: CLINIC | Age: 26
End: 2021-10-21

## 2021-10-21 VITALS — SYSTOLIC BLOOD PRESSURE: 132 MMHG | DIASTOLIC BLOOD PRESSURE: 90 MMHG | WEIGHT: 212 LBS

## 2021-10-21 DIAGNOSIS — Z3A.36 36 WEEKS GESTATION OF PREGNANCY: ICD-10-CM

## 2021-10-21 LAB
BILIRUB UR QL STRIP: NORMAL
GLUCOSE UR-MCNC: NORMAL
HCG UR QL: 0.2 EU/DL
HGB UR QL STRIP.AUTO: NORMAL
KETONES UR-MCNC: NORMAL
LEUKOCYTE ESTERASE UR QL STRIP: NORMAL
NITRITE UR QL STRIP: NORMAL
PH UR STRIP: 5.5
PROT UR STRIP-MCNC: NORMAL
SP GR UR STRIP: 1.03

## 2021-10-21 PROCEDURE — 0502F SUBSEQUENT PRENATAL CARE: CPT

## 2021-10-25 LAB
GP B STREP DNA SPEC QL NAA+PROBE: NORMAL
GP B STREP DNA SPEC QL NAA+PROBE: NOT DETECTED
SOURCE GBS: NORMAL

## 2021-10-26 ENCOUNTER — APPOINTMENT (OUTPATIENT)
Dept: ANTEPARTUM | Facility: CLINIC | Age: 26
End: 2021-10-26
Payer: COMMERCIAL

## 2021-10-26 ENCOUNTER — APPOINTMENT (OUTPATIENT)
Dept: MATERNAL FETAL MEDICINE | Facility: CLINIC | Age: 26
End: 2021-10-26
Payer: COMMERCIAL

## 2021-10-26 ENCOUNTER — ASOB RESULT (OUTPATIENT)
Age: 26
End: 2021-10-26

## 2021-10-26 VITALS
HEIGHT: 61 IN | BODY MASS INDEX: 40.97 KG/M2 | WEIGHT: 217 LBS | DIASTOLIC BLOOD PRESSURE: 84 MMHG | SYSTOLIC BLOOD PRESSURE: 130 MMHG

## 2021-10-26 PROCEDURE — 76816 OB US FOLLOW-UP PER FETUS: CPT | Mod: 26

## 2021-10-26 PROCEDURE — 76818 FETAL BIOPHYS PROFILE W/NST: CPT | Mod: 26

## 2021-10-26 PROCEDURE — ZZZZZ: CPT

## 2021-10-26 PROCEDURE — 99214 OFFICE O/P EST MOD 30 MIN: CPT | Mod: 25

## 2021-10-26 PROCEDURE — 99214 OFFICE O/P EST MOD 30 MIN: CPT | Mod: 24

## 2021-10-26 NOTE — DISCUSSION/SUMMARY
[FreeTextEntry1] : 26 year old  1 para 0 LMP 21 EDC 21 37 weeks gestation. Pregnancy complicated with gestational diabetes requiring insulin. \par Patient feels well and the baby is active.\par Blood sugar log left home patient states fasting values 88 - 114 postprandial 114 - 150. Blood sugar control improved.\par Biophysical profile 10/10 today.\par Current insulin Humulin 28 units before breakfast and 20 before dinner and 10 units of Humulin at bed time with a snack.\par Increase morning insulin to 32 units\par Continue to monitor blood sugar.\par Biophysical profile with non stress test on Friday\par Check fetal movements daily. If baby is moving less than usual to go to Labor and Delivery for evaluation \par Scheduled for c/section .\par .\par  \par

## 2021-10-27 ENCOUNTER — NON-APPOINTMENT (OUTPATIENT)
Age: 26
End: 2021-10-27

## 2021-10-28 ENCOUNTER — APPOINTMENT (OUTPATIENT)
Dept: OBGYN | Facility: CLINIC | Age: 26
End: 2021-10-28
Payer: COMMERCIAL

## 2021-10-28 DIAGNOSIS — Z3A.37 37 WEEKS GESTATION OF PREGNANCY: ICD-10-CM

## 2021-10-28 DIAGNOSIS — O24.414 GESTATIONAL DIABETES MELLITUS IN PREGNANCY, INSULIN CONTROLLED: ICD-10-CM

## 2021-10-28 LAB
BILIRUB UR QL STRIP: NORMAL
GLUCOSE UR-MCNC: NORMAL
HCG UR QL: 1 EU/DL
HGB UR QL STRIP.AUTO: NORMAL
KETONES UR-MCNC: NORMAL
LEUKOCYTE ESTERASE UR QL STRIP: NORMAL
NITRITE UR QL STRIP: NORMAL
PH UR STRIP: 6
PROT UR STRIP-MCNC: NORMAL
SP GR UR STRIP: 1.03

## 2021-10-28 PROCEDURE — 0502F SUBSEQUENT PRENATAL CARE: CPT

## 2021-10-29 ENCOUNTER — APPOINTMENT (OUTPATIENT)
Dept: MATERNAL FETAL MEDICINE | Facility: CLINIC | Age: 26
End: 2021-10-29
Payer: COMMERCIAL

## 2021-10-29 ENCOUNTER — ASOB RESULT (OUTPATIENT)
Age: 26
End: 2021-10-29

## 2021-10-29 ENCOUNTER — APPOINTMENT (OUTPATIENT)
Dept: ANTEPARTUM | Facility: CLINIC | Age: 26
End: 2021-10-29
Payer: COMMERCIAL

## 2021-10-29 VITALS
WEIGHT: 215 LBS | HEIGHT: 61 IN | SYSTOLIC BLOOD PRESSURE: 128 MMHG | BODY MASS INDEX: 40.59 KG/M2 | HEART RATE: 89 BPM | DIASTOLIC BLOOD PRESSURE: 84 MMHG

## 2021-10-29 LAB
BILIRUBIN URINE: NORMAL
BLOOD URINE: NORMAL
GLUCOSE QUALITATIVE U: NORMAL
KETONES URINE: NORMAL
LEUKOCYTE ESTERASE URINE: NORMAL
NITRITE URINE: NORMAL
PH URINE: 5
PROTEIN URINE: NORMAL
SPECIFIC GRAVITY URINE: NORMAL
UROBILINOGEN URINE: NORMAL

## 2021-10-29 PROCEDURE — ZZZZZ: CPT

## 2021-10-29 PROCEDURE — 76818 FETAL BIOPHYS PROFILE W/NST: CPT | Mod: 26

## 2021-10-29 PROCEDURE — 99213 OFFICE O/P EST LOW 20 MIN: CPT | Mod: 25

## 2021-10-29 NOTE — HISTORY OF PRESENT ILLNESS
[FreeTextEntry1] : Ms. Vela is a 26 year old  @ 37 weeks 3 days with gestational diabetes on insulin.  She reports positive fetal movement and no vaginal bleeding.

## 2021-10-29 NOTE — DISCUSSION/SUMMARY
[FreeTextEntry1] : Ms. Vela is a  26 year old  @ 37 weeks 3 days with gestational diabetes on insulin.\par \par \par GDM A2.  She is on NPH 32 units in the morning, 20 units with dinner,  NPH 10 units at bedtime with a snack.  WIll increase NPH with breakfast to 36 units  and NPH at night to 14 units.\par Fasting values .  2 hour lunch and dinner postprandial 120-200. \par Biophysical profile 10/10. \par \par Prenatal care is with Dr. Fried.\par Hypoglycemia, fetal movement, and labor precautions discussed.\par 2 hour GTT to be prescribed by Dr. Fried at around 6 weeks postpartum.\par Scheduled for c/section .\par \par Cruz Osorio MD\par  \par

## 2021-11-01 ENCOUNTER — RESULT REVIEW (OUTPATIENT)
Age: 26
End: 2021-11-01

## 2021-11-01 ENCOUNTER — NON-APPOINTMENT (OUTPATIENT)
Age: 26
End: 2021-11-01

## 2021-11-01 ENCOUNTER — INPATIENT (INPATIENT)
Facility: HOSPITAL | Age: 26
LOS: 1 days | Discharge: HOME | End: 2021-11-03
Attending: OBSTETRICS & GYNECOLOGY | Admitting: OBSTETRICS & GYNECOLOGY
Payer: COMMERCIAL

## 2021-11-01 DIAGNOSIS — Z34.90 ENCOUNTER FOR SUPERVISION OF NORMAL PREGNANCY, UNSPECIFIED, UNSPECIFIED TRIMESTER: ICD-10-CM

## 2021-11-01 LAB
AMPHET UR-MCNC: NEGATIVE — SIGNIFICANT CHANGE UP
APPEARANCE UR: ABNORMAL
BACTERIA # UR AUTO: ABNORMAL
BARBITURATES UR SCN-MCNC: NEGATIVE — SIGNIFICANT CHANGE UP
BASOPHILS # BLD AUTO: 0.02 K/UL — SIGNIFICANT CHANGE UP (ref 0–0.2)
BASOPHILS NFR BLD AUTO: 0.3 % — SIGNIFICANT CHANGE UP (ref 0–1)
BENZODIAZ UR-MCNC: NEGATIVE — SIGNIFICANT CHANGE UP
BILIRUB UR-MCNC: NEGATIVE — SIGNIFICANT CHANGE UP
BLD GP AB SCN SERPL QL: SIGNIFICANT CHANGE UP
BUPRENORPHINE SCREEN, URINE RESULT: NEGATIVE — SIGNIFICANT CHANGE UP
COCAINE METAB.OTHER UR-MCNC: NEGATIVE — SIGNIFICANT CHANGE UP
COLOR SPEC: SIGNIFICANT CHANGE UP
DIFF PNL FLD: NEGATIVE — SIGNIFICANT CHANGE UP
EOSINOPHIL # BLD AUTO: 0.05 K/UL — SIGNIFICANT CHANGE UP (ref 0–0.7)
EOSINOPHIL NFR BLD AUTO: 0.6 % — SIGNIFICANT CHANGE UP (ref 0–8)
EPI CELLS # UR: 5 /HPF — SIGNIFICANT CHANGE UP (ref 0–5)
FENTANYL UR QL: NEGATIVE — SIGNIFICANT CHANGE UP
GLUCOSE BLDC GLUCOMTR-MCNC: 107 MG/DL — HIGH (ref 70–99)
GLUCOSE UR QL: NEGATIVE — SIGNIFICANT CHANGE UP
HCT VFR BLD CALC: 35 % — LOW (ref 37–47)
HGB BLD-MCNC: 11.4 G/DL — LOW (ref 12–16)
HIV 1 & 2 AB SERPL IA.RAPID: SIGNIFICANT CHANGE UP
HYALINE CASTS # UR AUTO: 3 /LPF — SIGNIFICANT CHANGE UP (ref 0–7)
IMM GRANULOCYTES NFR BLD AUTO: 0.6 % — HIGH (ref 0.1–0.3)
KETONES UR-MCNC: ABNORMAL
L&D DRUG SCREEN, URINE: SIGNIFICANT CHANGE UP
LEUKOCYTE ESTERASE UR-ACNC: NEGATIVE — SIGNIFICANT CHANGE UP
LYMPHOCYTES # BLD AUTO: 1.73 K/UL — SIGNIFICANT CHANGE UP (ref 1.2–3.4)
LYMPHOCYTES # BLD AUTO: 22.3 % — SIGNIFICANT CHANGE UP (ref 20.5–51.1)
MCHC RBC-ENTMCNC: 26.2 PG — LOW (ref 27–31)
MCHC RBC-ENTMCNC: 32.6 G/DL — SIGNIFICANT CHANGE UP (ref 32–37)
MCV RBC AUTO: 80.5 FL — LOW (ref 81–99)
METHADONE UR-MCNC: NEGATIVE — SIGNIFICANT CHANGE UP
MONOCYTES # BLD AUTO: 0.77 K/UL — HIGH (ref 0.1–0.6)
MONOCYTES NFR BLD AUTO: 9.9 % — HIGH (ref 1.7–9.3)
NEUTROPHILS # BLD AUTO: 5.15 K/UL — SIGNIFICANT CHANGE UP (ref 1.4–6.5)
NEUTROPHILS NFR BLD AUTO: 66.3 % — SIGNIFICANT CHANGE UP (ref 42.2–75.2)
NITRITE UR-MCNC: NEGATIVE — SIGNIFICANT CHANGE UP
NRBC # BLD: 0 /100 WBCS — SIGNIFICANT CHANGE UP (ref 0–0)
OPIATES UR-MCNC: NEGATIVE — SIGNIFICANT CHANGE UP
OXYCODONE UR-MCNC: NEGATIVE — SIGNIFICANT CHANGE UP
PCP UR-MCNC: NEGATIVE — SIGNIFICANT CHANGE UP
PH UR: 6.5 — SIGNIFICANT CHANGE UP (ref 5–8)
PLATELET # BLD AUTO: 146 K/UL — SIGNIFICANT CHANGE UP (ref 130–400)
PRENATAL SYPHILIS TEST: SIGNIFICANT CHANGE UP
PROPOXYPHENE QUALITATIVE URINE RESULT: NEGATIVE — SIGNIFICANT CHANGE UP
PROT UR-MCNC: ABNORMAL
RBC # BLD: 4.35 M/UL — SIGNIFICANT CHANGE UP (ref 4.2–5.4)
RBC # FLD: 14.3 % — SIGNIFICANT CHANGE UP (ref 11.5–14.5)
RBC CASTS # UR COMP ASSIST: 3 /HPF — SIGNIFICANT CHANGE UP (ref 0–4)
SP GR SPEC: 1.01 — SIGNIFICANT CHANGE UP (ref 1.01–1.03)
UROBILINOGEN FLD QL: SIGNIFICANT CHANGE UP
WBC # BLD: 7.77 K/UL — SIGNIFICANT CHANGE UP (ref 4.8–10.8)
WBC # FLD AUTO: 7.77 K/UL — SIGNIFICANT CHANGE UP (ref 4.8–10.8)
WBC UR QL: 4 /HPF — SIGNIFICANT CHANGE UP (ref 0–5)

## 2021-11-01 PROCEDURE — 59510 CESAREAN DELIVERY: CPT | Mod: U7

## 2021-11-01 PROCEDURE — 88307 TISSUE EXAM BY PATHOLOGIST: CPT | Mod: 26

## 2021-11-01 RX ORDER — INFLUENZA VIRUS VACCINE 15; 15; 15; 15 UG/.5ML; UG/.5ML; UG/.5ML; UG/.5ML
0.5 SUSPENSION INTRAMUSCULAR ONCE
Refills: 0 | Status: DISCONTINUED | OUTPATIENT
Start: 2021-11-01 | End: 2021-11-03

## 2021-11-01 RX ORDER — ENOXAPARIN SODIUM 100 MG/ML
40 INJECTION SUBCUTANEOUS EVERY 24 HOURS
Refills: 0 | Status: DISCONTINUED | OUTPATIENT
Start: 2021-11-01 | End: 2021-11-03

## 2021-11-01 RX ORDER — OXYCODONE HYDROCHLORIDE 5 MG/1
5 TABLET ORAL
Refills: 0 | Status: DISCONTINUED | OUTPATIENT
Start: 2021-11-01 | End: 2021-11-03

## 2021-11-01 RX ORDER — ACETAMINOPHEN 500 MG
975 TABLET ORAL
Refills: 0 | Status: DISCONTINUED | OUTPATIENT
Start: 2021-11-01 | End: 2021-11-03

## 2021-11-01 RX ORDER — LANOLIN
1 OINTMENT (GRAM) TOPICAL EVERY 6 HOURS
Refills: 0 | Status: DISCONTINUED | OUTPATIENT
Start: 2021-11-01 | End: 2021-11-03

## 2021-11-01 RX ORDER — IBUPROFEN 200 MG
600 TABLET ORAL EVERY 6 HOURS
Refills: 0 | Status: COMPLETED | OUTPATIENT
Start: 2021-11-01 | End: 2022-09-30

## 2021-11-01 RX ORDER — IBUPROFEN 200 MG
600 TABLET ORAL EVERY 6 HOURS
Refills: 0 | Status: DISCONTINUED | OUTPATIENT
Start: 2021-11-01 | End: 2021-11-03

## 2021-11-01 RX ORDER — SIMETHICONE 80 MG/1
80 TABLET, CHEWABLE ORAL EVERY 4 HOURS
Refills: 0 | Status: DISCONTINUED | OUTPATIENT
Start: 2021-11-01 | End: 2021-11-03

## 2021-11-01 RX ORDER — OXYCODONE HYDROCHLORIDE 5 MG/1
5 TABLET ORAL ONCE
Refills: 0 | Status: DISCONTINUED | OUTPATIENT
Start: 2021-11-01 | End: 2021-11-03

## 2021-11-01 RX ORDER — CITRIC ACID/SODIUM CITRATE 300-500 MG
30 SOLUTION, ORAL ORAL ONCE
Refills: 0 | Status: COMPLETED | OUTPATIENT
Start: 2021-11-01 | End: 2021-11-01

## 2021-11-01 RX ORDER — SODIUM CHLORIDE 9 MG/ML
1000 INJECTION, SOLUTION INTRAVENOUS
Refills: 0 | Status: DISCONTINUED | OUTPATIENT
Start: 2021-11-01 | End: 2021-11-03

## 2021-11-01 RX ORDER — DIPHENHYDRAMINE HCL 50 MG
25 CAPSULE ORAL EVERY 6 HOURS
Refills: 0 | Status: DISCONTINUED | OUTPATIENT
Start: 2021-11-01 | End: 2021-11-03

## 2021-11-01 RX ORDER — TETANUS TOXOID, REDUCED DIPHTHERIA TOXOID AND ACELLULAR PERTUSSIS VACCINE, ADSORBED 5; 2.5; 8; 8; 2.5 [IU]/.5ML; [IU]/.5ML; UG/.5ML; UG/.5ML; UG/.5ML
0.5 SUSPENSION INTRAMUSCULAR ONCE
Refills: 0 | Status: DISCONTINUED | OUTPATIENT
Start: 2021-11-01 | End: 2021-11-03

## 2021-11-01 RX ORDER — FAMOTIDINE 10 MG/ML
20 INJECTION INTRAVENOUS ONCE
Refills: 0 | Status: COMPLETED | OUTPATIENT
Start: 2021-11-01 | End: 2021-11-01

## 2021-11-01 RX ORDER — SODIUM CHLORIDE 9 MG/ML
1000 INJECTION, SOLUTION INTRAVENOUS
Refills: 0 | Status: DISCONTINUED | OUTPATIENT
Start: 2021-11-01 | End: 2021-11-01

## 2021-11-01 RX ORDER — NALOXONE HYDROCHLORIDE 4 MG/.1ML
0.1 SPRAY NASAL
Refills: 0 | Status: DISCONTINUED | OUTPATIENT
Start: 2021-11-01 | End: 2021-11-03

## 2021-11-01 RX ORDER — SODIUM CHLORIDE 9 MG/ML
1000 INJECTION, SOLUTION INTRAVENOUS ONCE
Refills: 0 | Status: DISCONTINUED | OUTPATIENT
Start: 2021-11-01 | End: 2021-11-01

## 2021-11-01 RX ORDER — OXYTOCIN 10 UNIT/ML
333.33 VIAL (ML) INJECTION
Qty: 20 | Refills: 0 | Status: DISCONTINUED | OUTPATIENT
Start: 2021-11-01 | End: 2021-11-03

## 2021-11-01 RX ORDER — DEXAMETHASONE 0.5 MG/5ML
4 ELIXIR ORAL EVERY 6 HOURS
Refills: 0 | Status: DISCONTINUED | OUTPATIENT
Start: 2021-11-01 | End: 2021-11-03

## 2021-11-01 RX ORDER — MAGNESIUM HYDROXIDE 400 MG/1
30 TABLET, CHEWABLE ORAL
Refills: 0 | Status: DISCONTINUED | OUTPATIENT
Start: 2021-11-01 | End: 2021-11-03

## 2021-11-01 RX ORDER — ONDANSETRON 8 MG/1
4 TABLET, FILM COATED ORAL EVERY 6 HOURS
Refills: 0 | Status: DISCONTINUED | OUTPATIENT
Start: 2021-11-01 | End: 2021-11-03

## 2021-11-01 RX ORDER — CEFAZOLIN SODIUM 1 G
2000 VIAL (EA) INJECTION ONCE
Refills: 0 | Status: COMPLETED | OUTPATIENT
Start: 2021-11-01 | End: 2021-11-01

## 2021-11-01 RX ORDER — MORPHINE SULFATE 50 MG/1
0.15 CAPSULE, EXTENDED RELEASE ORAL ONCE
Refills: 0 | Status: DISCONTINUED | OUTPATIENT
Start: 2021-11-01 | End: 2021-11-03

## 2021-11-01 RX ORDER — OXYTOCIN 10 UNIT/ML
333.33 VIAL (ML) INJECTION
Qty: 20 | Refills: 0 | Status: DISCONTINUED | OUTPATIENT
Start: 2021-11-01 | End: 2021-11-01

## 2021-11-01 RX ADMIN — SIMETHICONE 80 MILLIGRAM(S): 80 TABLET, CHEWABLE ORAL at 20:47

## 2021-11-01 RX ADMIN — ENOXAPARIN SODIUM 40 MILLIGRAM(S): 100 INJECTION SUBCUTANEOUS at 20:47

## 2021-11-01 RX ADMIN — Medication 100 MILLIGRAM(S): at 07:58

## 2021-11-01 RX ADMIN — Medication 975 MILLIGRAM(S): at 21:51

## 2021-11-01 RX ADMIN — Medication 30 MILLILITER(S): at 07:58

## 2021-11-01 RX ADMIN — FAMOTIDINE 20 MILLIGRAM(S): 10 INJECTION INTRAVENOUS at 07:59

## 2021-11-01 NOTE — OB RN INTRAOPERATIVE NOTE - NSSELHIDDEN_OBGYN_ALL_OB_FT
[NS_DeliveryAttending1_OBGYN_ALL_OB_FT:[id number]],[NS_DeliveryAssist1_OBGYN_ALL_OB_FT:[id number]],[NS_DeliveryRN_OBGYN_ALL_OB_FT:[id number]=]

## 2021-11-01 NOTE — OB RN PATIENT PROFILE - WILL THE PATIENT ACCEPT THE PFIZER COVID-19 VACCINE IF ELIGIBLE AND IT IS AVAILABLE?
Patient is still waiting to hear INR to adjust her medications. Please call back to convey results.    Thank you.   No

## 2021-11-01 NOTE — OB RN DELIVERY SUMMARY - NS_BREASTINHOURA_OBGYN_ALL_OB
"SUBJECTIVE:  The patient is a 59-year-old white male who comes in because \"he just doesn't feel right.  He has fatigue and feels like that time before he had a stent.  \"In September 2017 patient ended up requiring stenting for coronary artery disease.  Today denies chest pain or shortness of bradycardia.  He has seen his cardiologist within the last year.  He gets some exertional dyspnea.    PAST MEDICAL HISTORY:  Reviewed.    REVIEW OF SYSTEMS:  Please see above; 14 point ROS negative.      OBJECTIVE: Vitals signs are reviewed and are stable.    HEENT: PERRLA.   Neck:  Supple.   Lungs:  Clear.    Heart:  Regular rate and rhythm.   Abdomen:   Soft, nontender.   Extremities:  No cyanosis, clubbing or edema.      ECG 12 Lead  Date/Time: 7/16/2018 10:34 AM  Performed by: JOHANA REID  Authorized by: JOHANA REID   Rhythm: sinus bradycardia         EKG is done here and interpreted by me.  EKG compared to January of this year.  EKG shows sinus bradycardia slight T-wave inversion in V1.  EKG otherwise unchanged    ASSESSMENT:    Fatigue  Coronary artery disease  Dyspnea with exertion    PLAN: Stress test is ordered.  CBC CMP TSH thyroid profile fasting lipids ordered.  Patient will follow up on labs.  He will follow-up after his stress test.  He will go to emergency room if any significant problems.    Dictated utilizing Dragon dictation.  "
Unable to offer, due to mother's clinical indication

## 2021-11-01 NOTE — OB PROVIDER H&P - ASSESSMENT
A/P: 25 yo  @37w6d, on 62u humalog daily, for primary c/s for uncontrolled GDMA2  -admit to l&d  -NPO  -2g ancef preop  -reglan/bicitra/pepcid  -on call to the OR    Dr. Adam and Dr. Escamilla aware   A/P: 27 yo  @37w6d, on 62u humalog daily, measles nonimmune, for primary c/s for uncontrolled GDMA2  -admit to l&d  -NPO  -2g ancef preop  -reglan/bicitra/pepcid  -on call to the OR  -MMR postpartum    Dr. Adam and Dr. Escamilla aware

## 2021-11-01 NOTE — OB PROVIDER H&P - NSHPLABSRESULTS_GEN_ALL_CORE
Sonograms  12w3d: anterior placenta, CRL 57.77 mm, NT 1.9mm wnl  20w3d: breech, anterior placenta, MVP 4.2cm, 381gm, anatomy wnl  33w0d: vertex, anterior placenta, MVP 4.97cm, BPP 8/8, 2547gm (92%)  34w0d: vertex, anterior placenta, MVP 4.59cm, BPP 8/8  35w0d: vertex, anterior placenta, MVP 5.45cm, BPP 8/8   35w3d: vertex, anterior placenta, MVP 5.73cm, BPP 8/8  36w0d: vertex, posterior placenta, MVP 6.72cm, BPP 8/8  37w0d: vertex, anterior, MVP 5.64cm, BPP 10/10, 3439cm (86%)  37w3d: vertex, anterior, MVP 5.49cm, BPP 10/10      /219/191/148

## 2021-11-01 NOTE — OB PROVIDER H&P - NS_OBGYNHISTORY_OBGYN_ALL_OB_FT
Ob Hx:   Gyn hx: denies fibroids, cysts, abnormal pap smears and STIs Ob Hx: . eTOPx1 with d&c  Gyn hx: denies fibroids, cysts, abnormal pap smears and STIs

## 2021-11-01 NOTE — OB RN DELIVERY SUMMARY - NSSELHIDDEN_OBGYN_ALL_OB_FT
[NS_DeliveryAttending1_OBGYN_ALL_OB_FT:[id number]],[NS_DeliveryAssist1_OBGYN_ALL_OB_FT:[id number]],[NS_DeliveryRN_OBGYN_ALL_OB_FT:[id number]=]

## 2021-11-01 NOTE — OB RN PATIENT PROFILE - VISION (WITH CORRECTIVE LENSES IF THE PATIENT USUALLY WEARS THEM):
farsided and pt has stigmatism/Normal vision: sees adequately in most situations; can see medication labels, newsprint

## 2021-11-01 NOTE — OB RN DELIVERY SUMMARY - NS_GENERALBABYACOMMENTA_OBGYN_ALL_OB_FT
report given to Esthela cancino rn in reg nursry - 37.6 week  primary c/section gdm . pt voided ,no stool baby may have formular if needed

## 2021-11-01 NOTE — BRIEF OPERATIVE NOTE - OPERATION/FINDINGS
normal uterus, fallopian tubes and ovaries, live male infant born apgars 9/9 normal uterus, fallopian tubes and ovaries, live male infant born apgars 9/9 weighing 3170g

## 2021-11-01 NOTE — OB PROVIDER H&P - HISTORY OF PRESENT ILLNESS
25 yo  @37w6d, BRET 21 dated by LMP and c/w first trimester sono, is admitted for primary  section for uncontrolled GDMA2. Denies contractions, leakage of fluid, vaginal bleeding. Reports good fetal movement. GBS negative. 27 yo  @37w6d, BRET 21 dated by LMP and c/w first trimester sono, is admitted for primary  section for uncontrolled GDMA2. Denies contractions, leakage of fluid, vaginal bleeding. Reports good fetal movement. Patient was followed by MFM and with weekly BPPs throughout the pregnancy for uncontrolled GDMA2. Her fasting fingersticks are 100-120. Her 2 hour postprandial fingersticks are between 150-180. She takes 62units humalog daily (32u in the morning, 20u at night, and 14u before bed). GBS negative.

## 2021-11-01 NOTE — CHART NOTE - NSCHARTNOTEFT_GEN_A_CORE
PACU ANESTHESIA ADMISSION NOTE      Procedure: C/S  Post op diagnosis:  Intra-uterine Pregnancy    ____  Intubated  TV:______       Rate: ______      FiO2: ______    _x___  Patent Airway    ___x_  Full return of protective reflexes    ___x_  Full recovery from anesthesia / back to baseline     Vitals:   T:  98         R:   15              BP:     83484             Sat:   100                P: 109      Mental Status:  _x___ Awake   _____ Alert   _____ Drowsy   _____ Sedated    Nausea/Vomiting:  __x__ NO  ______Yes,   See Post - Op Orders          Pain Scale (0-10):  _0___    Treatment: ____ None    ____ See Post - Op/PCA Orders    Post - Operative Fluids:   ____ Oral   __x__ See Post - Op Orders    Plan: Discharge:   ____Home       __x___Floor     _____Critical Care    _____  Other:_________________    Comments: Patient had spinal for primary c/s. Tolerated procedure well

## 2021-11-01 NOTE — OB PROVIDER H&P - NSHPPHYSICALEXAM_GEN_ALL_CORE
Physical exam:    Vital Signs Last 24 Hrs  T(F): 99 (01 Nov 2021 06:02), Max: 99 (01 Nov 2021 06:02)  HR: 103 (01 Nov 2021 06:12) (103 - 117)  BP: 134/78 (01 Nov 2021 06:12) (134/78 - 139/86)  RR: 20 (01 Nov 2021 06:02) (20 - 20)    Gen: NAD  Abdomen: soft, gravid, nontender, with nonpalpable contractions    EFM: 130/mod/pos acc  toco: none  SVE: deferred  Sono: cephalic Physical exam:    Vital Signs Last 24 Hrs  T(F): 99 (01 Nov 2021 06:02), Max: 99 (01 Nov 2021 06:02)  HR: 103 (01 Nov 2021 06:12) (103 - 117)  BP: 134/78 (01 Nov 2021 06:12) (134/78 - 139/86)  RR: 20 (01 Nov 2021 06:02) (20 - 20)    Gen: NAD  Abdomen: soft, gravid, nontender, with nonpalpable contractions    EFM: 130/mod/pos acc  toco: none  SVE: deferred  Sono: cephalic, anterior placenta Physical exam:    Vital Signs Last 24 Hrs  T(F): 99 (01 Nov 2021 06:02), Max: 99 (01 Nov 2021 06:02)  HR: 103 (01 Nov 2021 06:12) (103 - 117)  BP: 134/78 (01 Nov 2021 06:12) (134/78 - 139/86)  RR: 20 (01 Nov 2021 06:02) (20 - 20)        Gen: NAD  Abdomen: soft, gravid, nontender, with nonpalpable contractions    EFM: 130/mod/pos acc  toco: none  SVE: deferred  Sono: cephalic, anterior placenta

## 2021-11-01 NOTE — OB PROVIDER H&P - NSICDXFAMILYHX_GEN_ALL_CORE_FT
FAMILY HISTORY:  No pertinent family history in first degree relatives     FAMILY HISTORY:  FH: type 2 diabetes

## 2021-11-01 NOTE — OB PROVIDER DELIVERY SUMMARY - NSSELHIDDEN_OBGYN_ALL_OB_FT
[NS_DeliveryAttending1_OBGYN_ALL_OB_FT:[id number]],[NS_DeliveryAssist1_OBGYN_ALL_OB_FT:[id number]],[NS_DeliveryRN_OBGYN_ALL_OB_FT:[id number]=]
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
## 2021-11-02 ENCOUNTER — APPOINTMENT (OUTPATIENT)
Dept: MATERNAL FETAL MEDICINE | Facility: CLINIC | Age: 26
End: 2021-11-02

## 2021-11-02 ENCOUNTER — APPOINTMENT (OUTPATIENT)
Dept: ANTEPARTUM | Facility: CLINIC | Age: 26
End: 2021-11-02

## 2021-11-02 LAB
BASOPHILS # BLD AUTO: 0.03 K/UL — SIGNIFICANT CHANGE UP (ref 0–0.2)
BASOPHILS NFR BLD AUTO: 0.3 % — SIGNIFICANT CHANGE UP (ref 0–1)
EOSINOPHIL # BLD AUTO: 0.04 K/UL — SIGNIFICANT CHANGE UP (ref 0–0.7)
EOSINOPHIL NFR BLD AUTO: 0.4 % — SIGNIFICANT CHANGE UP (ref 0–8)
GLUCOSE BLDC GLUCOMTR-MCNC: 122 MG/DL — HIGH (ref 70–99)
GLUCOSE BLDC GLUCOMTR-MCNC: 123 MG/DL — HIGH (ref 70–99)
GLUCOSE BLDC GLUCOMTR-MCNC: 96 MG/DL — SIGNIFICANT CHANGE UP (ref 70–99)
HCT VFR BLD CALC: 30.2 % — LOW (ref 37–47)
HGB BLD-MCNC: 9.7 G/DL — LOW (ref 12–16)
IMM GRANULOCYTES NFR BLD AUTO: 0.4 % — HIGH (ref 0.1–0.3)
LYMPHOCYTES # BLD AUTO: 1.27 K/UL — SIGNIFICANT CHANGE UP (ref 1.2–3.4)
LYMPHOCYTES # BLD AUTO: 11.8 % — LOW (ref 20.5–51.1)
MCHC RBC-ENTMCNC: 26.6 PG — LOW (ref 27–31)
MCHC RBC-ENTMCNC: 32.1 G/DL — SIGNIFICANT CHANGE UP (ref 32–37)
MCV RBC AUTO: 82.7 FL — SIGNIFICANT CHANGE UP (ref 81–99)
MONOCYTES # BLD AUTO: 1.02 K/UL — HIGH (ref 0.1–0.6)
MONOCYTES NFR BLD AUTO: 9.4 % — HIGH (ref 1.7–9.3)
NEUTROPHILS # BLD AUTO: 8.4 K/UL — HIGH (ref 1.4–6.5)
NEUTROPHILS NFR BLD AUTO: 77.7 % — HIGH (ref 42.2–75.2)
NRBC # BLD: 0 /100 WBCS — SIGNIFICANT CHANGE UP (ref 0–0)
PLATELET # BLD AUTO: 142 K/UL — SIGNIFICANT CHANGE UP (ref 130–400)
RBC # BLD: 3.65 M/UL — LOW (ref 4.2–5.4)
RBC # FLD: 14.4 % — SIGNIFICANT CHANGE UP (ref 11.5–14.5)
WBC # BLD: 10.8 K/UL — SIGNIFICANT CHANGE UP (ref 4.8–10.8)
WBC # FLD AUTO: 10.8 K/UL — SIGNIFICANT CHANGE UP (ref 4.8–10.8)

## 2021-11-02 RX ADMIN — Medication 600 MILLIGRAM(S): at 18:32

## 2021-11-02 RX ADMIN — Medication 975 MILLIGRAM(S): at 21:33

## 2021-11-02 RX ADMIN — Medication 975 MILLIGRAM(S): at 22:05

## 2021-11-02 RX ADMIN — Medication 600 MILLIGRAM(S): at 06:17

## 2021-11-02 RX ADMIN — SIMETHICONE 80 MILLIGRAM(S): 80 TABLET, CHEWABLE ORAL at 19:05

## 2021-11-02 RX ADMIN — SIMETHICONE 80 MILLIGRAM(S): 80 TABLET, CHEWABLE ORAL at 06:17

## 2021-11-02 RX ADMIN — Medication 975 MILLIGRAM(S): at 09:25

## 2021-11-02 RX ADMIN — ENOXAPARIN SODIUM 40 MILLIGRAM(S): 100 INJECTION SUBCUTANEOUS at 21:35

## 2021-11-02 RX ADMIN — Medication 975 MILLIGRAM(S): at 03:01

## 2021-11-02 RX ADMIN — Medication 975 MILLIGRAM(S): at 09:26

## 2021-11-03 ENCOUNTER — TRANSCRIPTION ENCOUNTER (OUTPATIENT)
Age: 26
End: 2021-11-03

## 2021-11-03 VITALS
RESPIRATION RATE: 18 BRPM | DIASTOLIC BLOOD PRESSURE: 66 MMHG | TEMPERATURE: 97 F | SYSTOLIC BLOOD PRESSURE: 137 MMHG | HEART RATE: 89 BPM

## 2021-11-03 LAB
COVID-19 SPIKE DOMAIN AB INTERP: POSITIVE
COVID-19 SPIKE DOMAIN ANTIBODY RESULT: 62.9 U/ML — HIGH
GLUCOSE BLDC GLUCOMTR-MCNC: 95 MG/DL — SIGNIFICANT CHANGE UP (ref 70–99)
SARS-COV-2 IGG+IGM SERPL QL IA: 62.9 U/ML — HIGH
SARS-COV-2 IGG+IGM SERPL QL IA: POSITIVE

## 2021-11-03 RX ORDER — ACETAMINOPHEN 500 MG
3 TABLET ORAL
Qty: 0 | Refills: 0 | DISCHARGE
Start: 2021-11-03

## 2021-11-03 RX ORDER — IBUPROFEN 200 MG
1 TABLET ORAL
Qty: 0 | Refills: 0 | DISCHARGE
Start: 2021-11-03

## 2021-11-03 RX ADMIN — Medication 600 MILLIGRAM(S): at 05:58

## 2021-11-03 RX ADMIN — SIMETHICONE 80 MILLIGRAM(S): 80 TABLET, CHEWABLE ORAL at 05:59

## 2021-11-03 RX ADMIN — Medication 600 MILLIGRAM(S): at 00:59

## 2021-11-03 RX ADMIN — Medication 975 MILLIGRAM(S): at 03:40

## 2021-11-03 RX ADMIN — Medication 600 MILLIGRAM(S): at 01:30

## 2021-11-03 RX ADMIN — Medication 975 MILLIGRAM(S): at 09:03

## 2021-11-03 RX ADMIN — Medication 975 MILLIGRAM(S): at 03:02

## 2021-11-03 NOTE — PROGRESS NOTE ADULT - SUBJECTIVE AND OBJECTIVE BOX
Pt without problems
Chief Complaint: Post  section    HPI: Pt doing well, pain well controlled. No overnight events, no acute complaints. She is not yet ambulating or passing gas. She is tolerating regular diet without difficulty. She has a Washington catheter in place.     ROS: Denies cardiovascular or respiratory symptoms    PAST MEDICAL & SURGICAL HISTORY:  No significant past surgical history    Physical Exam  Vital Signs Last 24 Hrs  T(F): 97.7 (2021 09:28), Max: 99 (2021 06:02)  HR: 113 (2021 11:30) (80 - 117)  BP: 129/71 (2021 11:15) (101/55 - 144/87)  RR: 20 (2021 06:02) (20 - 20)    Physical exam:  General - AAOx3, NAD  Heart - S1S2, RRR  Lungs - CTA BL  Abdomen:  - Soft, appropriately tender, mildly distended, BS+. Clean, dry, intact steri strips in place over pfannenstiel skin incision.  - Fundus firm, appropriately tender, below the umbilicus  Pelvis/Vagina - Normal Lochia  Extremities - No calf tenderness, no swelling    Labs:                        11.4   7.77  )-----------( 146      ( 2021 05:27 )             35.0     Antibody Screen: NEG (21 @ 05:27)    Prenatal Syphilis Test: Nonreact (21 @ 05:27)    UO: ([id number]) 250cc
Pt without problems
Chief Complaint: Post  section    HPI: Pt doing well, pain well controlled. No overnight events, no acute complaints. She has been ambulating, voiding, passing gas, and tolerating regular diet without difficulty. She denies fevers, chills, SOB, CP, abdominal pain or heavy vaginal bleeding.    ROS: Denies cardiovascular or respiratory symptoms    PAST MEDICAL & SURGICAL HISTORY:  No significant past surgical history    Physical Exam  Vital Signs Last 24 Hrs  T(F): 98.6 (2021 02:57), Max: 98.7 (2021 19:27)  HR: 95 (2021 02:57) (78 - 116)  BP: 137/71 (2021 02:57) (101/55 - 141/82)  RR: 18 (2021 02:57) (18 - 18)    Physical exam:  General - AAOx3, NAD  Heart - S1S2, RRR  Lungs - CTA BL  Abdomen:  - Soft, nontender, mildly distended, BS+. Clean, dry, intact staples in place over pfannenstiel skin incision.  - Fundus firm, nontender, below the umbilicus  Pelvis/Vagina - Normal Lochia  Extremities - No calf tenderness, no swelling    Labs:                        11.4   7.77  )-----------( 146      ( 2021 05:27 )             35.0     Antibody Screen: NEG (21 @ 05:27)    
Chief Complaint: Post  section    HPI: Pt doing well, pain well controlled. No overnight events, no acute complaints. She has been ambulating, voiding, passing gas, and tolerating regular diet without difficulty. She is bottle feeding without any issues.    ROS: Denies cardiovascular or respiratory symptoms    PAST MEDICAL & SURGICAL HISTORY:  No significant past surgical history    Physical Exam  Vital Signs Last 24 Hrs  T(F): 97.4 (2021 03:46), Max: 98.3 (2021 20:18)  HR: 92 (2021 03:46) (83 - 97)  BP: 117/69 (2021 03:46) (115/57 - 134/76)  RR: 18 (2021 03:46) (18 - 19)    Physical exam:  General - AAOx3, NAD  Heart - S1S2, RRR  Lungs - CTA BL  Abdomen:  - Soft, nontender, mildly distended, BS+. Clean, dry, intact steri strips in place over pfannenstiel skin incision.  - Fundus firm, nontender, below the umbilicus  Pelvis/Vagina - Normal Lochia  Extremities - No calf tenderness, no swelling    Labs:                        9.7    10.80 )-----------( 142      ( 2021 07:01 )             30.2                         11.4   7.77  )-----------( 146      ( 2021 05:27 )             35.0     Antibody Screen: NEG (21 @ 05:27)

## 2021-11-03 NOTE — DISCHARGE NOTE OB - PATIENT PORTAL LINK FT
You can access the FollowMyHealth Patient Portal offered by [facility] by registering at the following website: [email or web address]. By joining [facility]’s FollowMyHealth portal, you will also be able to view your health information using other applications (apps) compatible with our system.

## 2021-11-03 NOTE — DISCHARGE NOTE OB - HOSPITAL COURSE
Patient underwent an uncomplicated primary LTCS for poorly controlled GDMA1. Patient’s postoperative course was unremarkable and she remained hemodynamically stable and afebrile throughout. Upon discharge on POD2, the patient is ambulating and voiding spontaneously, tolerating oral intake, pain was well controlled with oral medication, and vital signs were stable.

## 2021-11-03 NOTE — DISCHARGE NOTE OB - CARE PROVIDER_API CALL
Yossi Mendez)  Obstetrics and Gynecology  [facility] [place] [address]  Phone: [phone]  Fax: [phone]  Follow Up Time:

## 2021-11-03 NOTE — PROGRESS NOTE ADULT - ASSESSMENT
26y now P1 s/p primary  for poorly controlled GDMA1, POD#0, recovering well.     - encourage ambulation  - PO hydration   - Continue Diet as tolerated  - DVT ppx: SCDs and Lovenox   - Incentive Spirometry bedside and encouraged   - f/u AM CBC   - Vital signs [id number]   - Washington catheter in place until the morning    Dr. Adam and Dr. Mendez to be made aware  
26y now P1 s/p primary  for poorly controlled GDMA1, POD#1, recovering well.     - encourage ambulation  - PO hydration   - Continue Diet as tolerated  - DVT ppx: SCDs and Lovenox   - Incentive Spirometry bedside and encouraged   - f/u AM CBC   - Vital signs [id number]   - Washington catheter discontinued, TOV 1030am    Dr. Adam and Dr. Mendez to be made aware    
26y now P1 s/p primary  for poorly controlled GDMA1, POD#2, recovering well.     - encourage ambulation  - PO hydration   - Continue Diet as tolerated  - DVT ppx: SCDs and Lovenox   - Incentive Spirometry bedside and encouraged   - Vital signs [id number]   - FS before meals and bedtime    Dr. Adam and Dr. Mendez to be made aware
POD 2 discharge home. 
POD 1, advance diet.

## 2021-11-03 NOTE — DISCHARGE NOTE OB - MEDICATION SUMMARY - MEDICATIONS TO TAKE
I will START or STAY ON the medications listed below when I get home from the hospital:    acetaminophen 325 mg oral tablet  -- 3 tab(s) by mouth   -- Indication: For for pain as needed    ibuprofen 600 mg oral tablet  -- 1 tab(s) by mouth every 6 hours  -- Indication: For for pain as needed   need to admit/radiology results

## 2021-11-04 ENCOUNTER — APPOINTMENT (OUTPATIENT)
Dept: ANTEPARTUM | Facility: CLINIC | Age: 26
End: 2021-11-04

## 2021-11-04 ENCOUNTER — APPOINTMENT (OUTPATIENT)
Dept: MATERNAL FETAL MEDICINE | Facility: CLINIC | Age: 26
End: 2021-11-04

## 2021-11-04 LAB — SURGICAL PATHOLOGY STUDY: SIGNIFICANT CHANGE UP

## 2021-11-05 DIAGNOSIS — Z3A.37 37 WEEKS GESTATION OF PREGNANCY: ICD-10-CM

## 2021-11-09 ENCOUNTER — APPOINTMENT (OUTPATIENT)
Dept: ANTEPARTUM | Facility: CLINIC | Age: 26
End: 2021-11-09

## 2021-11-09 ENCOUNTER — APPOINTMENT (OUTPATIENT)
Dept: MATERNAL FETAL MEDICINE | Facility: CLINIC | Age: 26
End: 2021-11-09

## 2021-11-09 RX ORDER — SERTRALINE HYDROCHLORIDE 50 MG/1
50 TABLET, FILM COATED ORAL DAILY
Qty: 90 | Refills: 3 | Status: ACTIVE | COMMUNITY
Start: 2021-11-09 | End: 1900-01-01

## 2021-11-11 ENCOUNTER — APPOINTMENT (OUTPATIENT)
Dept: OBGYN | Facility: CLINIC | Age: 26
End: 2021-11-11
Payer: COMMERCIAL

## 2021-11-11 ENCOUNTER — APPOINTMENT (OUTPATIENT)
Dept: OBGYN | Facility: CLINIC | Age: 26
End: 2021-11-11

## 2021-11-11 VITALS — SYSTOLIC BLOOD PRESSURE: 140 MMHG | WEIGHT: 182 LBS | DIASTOLIC BLOOD PRESSURE: 97 MMHG

## 2021-11-11 DIAGNOSIS — Z48.02 ENCOUNTER FOR REMOVAL OF SUTURES: ICD-10-CM

## 2021-11-11 PROCEDURE — 99024 POSTOP FOLLOW-UP VISIT: CPT

## 2021-11-11 NOTE — CHIEF COMPLAINT
[Procedure: ___] : status post [unfilled] [de-identified] : LISS GREENE is a 26 year femalefor staple remvovalo

## 2021-11-12 ENCOUNTER — APPOINTMENT (OUTPATIENT)
Dept: MATERNAL FETAL MEDICINE | Facility: CLINIC | Age: 26
End: 2021-11-12

## 2021-11-12 ENCOUNTER — APPOINTMENT (OUTPATIENT)
Dept: ANTEPARTUM | Facility: CLINIC | Age: 26
End: 2021-11-12

## 2021-11-13 PROBLEM — R00.0 TACHYCARDIA, UNSPECIFIED: Chronic | Status: INACTIVE | Noted: 2021-01-22 | Resolved: 2021-11-01

## 2021-11-13 PROBLEM — N39.0 URINARY TRACT INFECTION, SITE NOT SPECIFIED: Chronic | Status: INACTIVE | Noted: 2019-12-27 | Resolved: 2021-11-01

## 2021-11-27 NOTE — ED ADULT NURSE NOTE - NSHOSCREENINGQ1_ED_ALL_ED
Gen: awake and alert, interactive  Head: (+) left parietal scalp hematoma. No bony step off. No hemotympanum. No raccoon eyes. No koch signs.   HEENT: PERRL, oral mucosa moist, normal conjunctiva, neck supple, TM wnl b/l, normal oropharynx w/o exudates/edema.   Lung: CTAB, no respiratory distress  CV: rrr, no murmur, Normal perfusion  Abd: soft, NTND  MSK: No edema, no visible deformities  Neuro: good tone, moving all extremities equally  Skin: No rash No

## 2021-12-01 RX ORDER — SERTRALINE HYDROCHLORIDE 50 MG/1
50 TABLET, FILM COATED ORAL DAILY
Qty: 90 | Refills: 3 | Status: ACTIVE | COMMUNITY
Start: 2021-12-01 | End: 1900-01-01

## 2021-12-27 ENCOUNTER — NON-APPOINTMENT (OUTPATIENT)
Age: 26
End: 2021-12-27

## 2021-12-28 ENCOUNTER — APPOINTMENT (OUTPATIENT)
Dept: OBGYN | Facility: CLINIC | Age: 26
End: 2021-12-28

## 2022-01-11 ENCOUNTER — APPOINTMENT (OUTPATIENT)
Dept: OBGYN | Facility: CLINIC | Age: 27
End: 2022-01-11
Payer: COMMERCIAL

## 2022-01-11 VITALS
RESPIRATION RATE: 22 BRPM | DIASTOLIC BLOOD PRESSURE: 75 MMHG | BODY MASS INDEX: 35.87 KG/M2 | HEIGHT: 61 IN | WEIGHT: 190 LBS | SYSTOLIC BLOOD PRESSURE: 117 MMHG | HEART RATE: 86 BPM

## 2022-01-11 PROCEDURE — 0503F POSTPARTUM CARE VISIT: CPT

## 2022-01-11 RX ORDER — NORETHINDRONE ACETATE AND ETHINYL ESTRADIOL AND FERROUS FUMARATE 1MG-20(21)
1-20 KIT ORAL DAILY
Qty: 3 | Refills: 3 | Status: ACTIVE | COMMUNITY
Start: 2022-01-11 | End: 1900-01-01

## 2022-01-11 NOTE — END OF VISIT
[FreeTextEntry3] : LISS GREENE is a 26 year female\par healing well junel 1/20    Pt still on Zoloft

## 2022-01-11 NOTE — HISTORY OF PRESENT ILLNESS
[Postpartum Follow Up] : postpartum follow up [Complications:___] : no complications [Primary C/S] : delivered by  section [Breastfeeding] : not currently nursing [S/Sx PP Depression] : no signs/symptoms of postpartum depression [Erythema] : not erythematous [Healed] : healed [Back to Normal] : is back to normal in size [Normal] : the vagina was normal [Cervix Sample Taken] : cervical sample not taken for a Pap smear [Not Done] : Examination of breasts not done [Doing Well] : is doing well [No Sign of Infection] : is showing no signs of infection [Excellent Pain Control] : has excellent pain control [None] : None

## 2022-06-14 ENCOUNTER — APPOINTMENT (OUTPATIENT)
Dept: PLASTIC SURGERY | Facility: CLINIC | Age: 27
End: 2022-06-14

## 2022-08-23 ENCOUNTER — APPOINTMENT (OUTPATIENT)
Dept: OBGYN | Facility: CLINIC | Age: 27
End: 2022-08-23

## 2022-08-23 ENCOUNTER — NON-APPOINTMENT (OUTPATIENT)
Age: 27
End: 2022-08-23

## 2022-08-23 VITALS
WEIGHT: 195 LBS | SYSTOLIC BLOOD PRESSURE: 116 MMHG | DIASTOLIC BLOOD PRESSURE: 87 MMHG | BODY MASS INDEX: 38.28 KG/M2 | HEIGHT: 60 IN

## 2022-08-23 DIAGNOSIS — Z01.419 ENCOUNTER FOR GYNECOLOGICAL EXAMINATION (GENERAL) (ROUTINE) W/OUT ABNORMAL FINDINGS: ICD-10-CM

## 2022-08-23 DIAGNOSIS — E78.00 PURE HYPERCHOLESTEROLEMIA, UNSPECIFIED: ICD-10-CM

## 2022-08-23 PROCEDURE — 99395 PREV VISIT EST AGE 18-39: CPT

## 2022-08-23 NOTE — HISTORY OF PRESENT ILLNESS
[TextBox_4] : LISS GREENE is a 27 year female\par for pap and Labs.  Interval History as per resident note, personally verified by me. Patient with no further seizures reported and overall improved mentation but has been having intermittent episodes of perseveration. No additional focal neurologic deficits noted.    Focused neurologic exam:  MS Ricarda RASMUSSEN x 3, speech bradyphrenic  and mild to moderate dysarthria but otherwise fluent; had episode of 15-30 seconds of perseveration which resolved, follows simple commands. Rep/naming, attn/conc, recent and remote memory, fund of knowledge dec  CN - PERRL, EOMI, VFF, face sens/str intact b/l except for R NLFF, hearing intact to conversation b/l, SCM at least 4+/5 b/l and symmetric, tongue/palate midline  Motor - Normal bulk. Inc tone in L side (paratonic?) and normal tone in R side. RUE 4+/5, LUE 5/5, RLE 4/5, LLE 4+/5  Sens - LT/temp intact all, as above  DTR's - 2+ BUE's, 3+ R KJ, 2+ L KJ, 1+ AJ b/l, and downgoing b/l plantar response  Coord - Grossly appropriate for level of strength  Gait and station - Due to fall risk/safety concerns did not assess    Phenytoin level (8/12) - 4.6 (corrected 7.7)    A/P:  Epilepsy, intractable, with resolved status epilepticus  Encephalopathy  S/p renal transplant, ANA  Prior strokes  Atrial fibrillation  CAD  HTN  DM type 2  Transaminitis  Hyponatremia (Na 133)  Leukocytosis  UTI    - Patient with new episodes of perseveration concerning for non-convulsive seizures. Otherwise neurologically improved  - vEEG to evaluate for focal slowing, epileptiform discharges, and seizures  - Fosphenytoin 200mg IV BID, new level stable. Can convert to phenytoin 200mg PO BID once able to swallow. No need to check additional levels unless patient has more clinical seizures  - If additional AED needed may then consider Briviact with HD dosing  - Discussed restarting Eliquis for secondary stroke prevention with family and kidney transplant service. As this interacts with phenytoin would not be as ideal as it would be at lower efficacy. Also not optimal to change AED's given patient has experienced a number of side effects and he has a lower threshold for further seizures. Best options would be either Lovenox or warfarin (adjust to goal INR 2-3) long term. Family and other medical teams are in agreement  - Recent MRI brain w/ and w/o without clear evidence of infection, inflammation, or acute CNS event (possibly minimal enhancement). 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 May consider repeat MRI brain if seizures recur given high blood pressure but less likely  - Continue to address above medical problems, as you are doing  - Will continue to follow patient with you

## 2022-08-30 RX ORDER — SERTRALINE HYDROCHLORIDE 50 MG/1
50 TABLET, FILM COATED ORAL DAILY
Qty: 90 | Refills: 3 | Status: ACTIVE | COMMUNITY
Start: 2022-08-30 | End: 1900-01-01

## 2022-09-01 LAB — CYTOLOGY CVX/VAG DOC THIN PREP: NORMAL

## 2023-02-16 NOTE — PROCEDURAL SAFETY CHECKLIST WITH OR WITHOUT SEDATION - NSPREEQUIPSUP7_GEN_ALL_CORE
April Darleen    Age 30 y.o.    female    1988    MRN [id number]    2/22/2023  Arrival Time_____________  OR Time____________[address]     Procedure(s):  DILATATION AND CURETTAGE, VIDEO  HYSTEROSCOPY REMOVAL OF RETAINED PRODUCTS OF CONCEPTION                      General   Surgeon(s):  Sharri العلي, MD      DAY ADMIT ___  SDS/OP ___  OUTPT IN BED ___        Phone [phone] (home)     PCP _____________________ Phone_________________ Epic ( ) Epic CE ( ) Appt ________    ADDITIONAL INFO __________________________________ Cardio/Consult _____________    NOTES _____________________________________________________________________    ____________________________________________________________________________    PAT APPT DATE:________ TIME: ________  FAXED QAD: _______  (__) H&P w/ Hospitalist  __________________________________________________________________________  Preop Nurse phone screen complete: _____________  (__) CBC     (__) W/ DIFF ___________     (__) Hgb A1C    ___________  (__) CHEST X RAY   __________  (__) LIPID PROFILE  ___________  (__) EKG   __________  (__) PT/PTT   ___________  (__) PFT's   __________  (__) BMP   ___________  (__) CAROTIDS  __________  (__) CMP   ___________  (__) VEIN MAPPING  __________  (__) U/A   ___________  (__) HISTORY & PHYSICAL __________  (__) URINE C & S  ___________  (__) CARDIAC CLEARANCE __________  (__) U/A W/ FLEX  ___________  (__) PULM.  CLEARANCE __________  (__) SERUM PREGNANCY ___________  (__) Check Epic DOS orders __________  (__) TYPE & SCREEN __________repeat ( ) (__)  __________________ __________  (__) ALBUMIN   ___________  (__)  __________________ __________  (__) TRANSFERRIN  ___________  (__)  __________________ __________  (__) LIVER PROFILE  ___________  (__)  __________________ __________  (__) MRSA NASAL SWAB ___________  (__) URINE PREG DOS __________  (__) SED RATE  ___________  (__) BLOOD SUGAR DOS __________  (__) C-REACTIVE PROTEIN ___________    (__) VITAMIN D HYDROXY ___________  (__) BLOOD THINNERS __________    (__) ACE/ ARBS: _____________________     (__) BETABLOCKERS __________________ not applicable

## 2023-03-08 NOTE — ED ADULT NURSE NOTE - NS TRANSFER PATIENT BELONGINGS
PSYCHIATRY FOLLOW UP    Patient: Nina Ulloa  Date: 3/8/2023    :     1968       SOURCE OF INFORMATION  I based this report upon my review of information from written and electronic medical records and upon my interview and assessment of the patient's condition.    CHIEF COMPLAINT  Follow up for depression, JOSE, and insomnia    HISTORY OF PRESENTING ILLNESS  Taking medications as directed. No side effects.    Depression: patient reports \"a little\" depression.    JOSE: worse since last visit. Patient states that she is anxious \"a lot.\"    Insomnia: Wakes up several times overnight.      PAST PSYCHIATRIC HISTORY  Prior diagnoses:     Past psych medications:   Buspar - \"it gave me really bad side effects,\" patient states this caused involuntary movements around her mouth  Trazodone  Abilify - patient states this caused involuntary movements  Hydroxyzine 25 mg TID PRN anxiety - patient states this didn't help  Gabapentin - patient states this didn't help for anxiety  Seroquel - caused increased appetite and made patient feel \"numb\"  Trazodone - patient states this didn't help for sleep    Suicide attempts: none    Inpatient: one inpatient rehab at [facility] in 2022 for cocaine use    Outpatient: saw Nisha Rowe for about 1 year, last visit 2022      ALLERGIES  None    PAST MEDICAL HISTORY  Hypertension  Pulmonary emboli  Fatty liver  Elevated liver enzymes    PAST SURGICAL HISTORY  Cholecystectomy    FAMILY PSYCHIATRIC HISTORY  None    SOCIAL HISTORY  Living with: lives alone. Has 2 children that do not live with her (ages 33 and 22 as of 2022)    Work: works at [employer]    Caffeine: none    Tobacco: smokes 3 cigarettes a day    Alcohol: none    Illicit substances: one inpatient rehab at [facility] in 2022 for cocaine use. Last cocaine use 2022, prior to that used cocaine 1-2 times a week. Patient denies any other  illicit drug use      ROS      MENTAL STATUS EXAM  Appearance: Good hygiene, appropriately dressed, no acute distress  Behaivor: Cooperative with interview. Rambling  Level of consciousness: Alert and oriented  Speech: Normal rate, rhythm, volume, and tone  Language: Fluent in English  Attention/concentration: Grossly unimpaired  Psychomotor: Normal  Fund of knowledge: Appropriate for age and education level  Memory: No impairment in short term or long term memory  Mood: \"normal\"  Affect: Congruent with stated mood  Associations: Intact  Thought process: Linear, intact  Thought content: No SI/HI, no paranoia or delusions  Perceptual disorders/hallucinations: No AH/VH  Insight: Good  Judgement: Good      ASSESSMENT/PLAN  1. Depression - continue Lexapro 20 mg daily. Patient states that she does not want to make any medication changes today.    2. JOSE - continue Lexapro 20 mg daily. Increase Propranolol to 20 mg TID PRN anxiety. Instructed patient that she must absolutely not use cocaine if she is taking Propranolol. Continue Xanax 0.25 mg 3 times daily. Instructed patient that I will reduce dose of Xanax every other month and I will not provide an early refill.    3. Insomnia - stop Trazodone. Start Remeron 15 mg at bedtime.    Discussed with patient the importance of keeping scheduled appointments. Instructed patient to call at least 24 hours beforehand if they have to cancel an appointment, or the appointment will be labeled a late cancel/no show. Instructed patient that if they late cancel or no show 2 times they will receive a warning letter, and if they late cancel or no show after the warning letter is sent they may be dismissed from practice. Physician suggested setting up appointment reminders in patient's phone. Physician asked patient if there was anything else patient can do to help make appointments and patient said no.    This encounter was done through a video visit due to the COVID-19 emergency. The  patient gave verbal consent to do a video visit.  Patient location at time of visit: patient's home  Clinician location at time of visit: clinician's home  Length of visit: 15 minutes  E&M code 38372 for this visit is based on medical decision making.    Discussed risks, benefits, potential side effects, and alteratives to treatment for all medication changes. Obtained informed consent for all medication changes. Instructed patient that if they have any questions to call provider's office during regular work hours. Instructed patient that if they experience any significant worsening of symptoms, severe side effects to medication, or intent or plan to harm themselves or others to call 911 or visit emergency department.  Return for follow up appointment in: 4 weeks    Abdulkadir Logan MD    Chief Complaint   Patient presents with   • Medication Management   • Video Visit     Current Outpatient Medications   Medication Sig   • escitalopram (LEXAPRO) 20 MG tablet Take 1 tablet by mouth daily.   • propRANolol (INDERAL) 20 MG tablet Take 1 tablet by mouth 3 times daily as needed (for anxiety).   • ALPRAZolam (XANAX) 0.5 MG tablet Take one-half tablet 3 times daily. Dose will be reduced every other month (will reduce next time April 2023)   • mirtazapine (Remeron) 15 MG tablet Take 1 tablet by mouth nightly.   • amLODIPine (NORVASC) 5 MG tablet Take 5 mg by mouth daily.     No current facility-administered medications for this visit.     ALLERGIES:  No Known Allergies  Past Medical History:   Diagnosis Date   • Essential (primary) hypertension    • Pulmonary emboli (CMS/HCC)      Past Surgical History:   Procedure Laterality Date   • Cholecystectomy        No family history on file.   Social History     Socioeconomic History   • Marital status: Single     Spouse name: Not on file   • Number of children: Not on file   • Years of education: Not on file   • Highest education level: Not on file   Occupational History   • Not  on file   Tobacco Use   • Smoking status: Not on file   • Smokeless tobacco: Not on file   Substance and Sexual Activity   • Alcohol use: Not on file   • Drug use: Not on file   • Sexual activity: Not on file   Other Topics Concern   • Not on file   Social History Narrative   • Not on file     Social Determinants of Health     Financial Resource Strain: Not on file   Food Insecurity: Not on file   Transportation Needs: Not on file   Physical Activity: Not on file   Stress: Not on file   Social Connections: Not on file   Intimate Partner Violence: Not on file     The primary encounter diagnosis was Major depressive disorder, single episode, in remission (CMD). Diagnoses of Generalized anxiety disorder and Primary insomnia were also pertinent to this visit.   Cell Phone/PDA (specify)/Clothing

## 2023-06-12 RX ORDER — SERTRALINE HYDROCHLORIDE 50 MG/1
50 TABLET, FILM COATED ORAL DAILY
Qty: 90 | Refills: 3 | Status: ACTIVE | COMMUNITY
Start: 2023-06-12 | End: 1900-01-01

## 2023-08-29 NOTE — OB PROVIDER DELIVERY SUMMARY - NSVAGINALEXAMCERT_OBGYN_ALL_OB
The Delivery OB Provider certifies that vaginal examination and/or abdominal examination after the delivery was done and no foreign body was found.
Cherise, group home/family

## 2023-09-14 RX ORDER — SERTRALINE 25 MG/1
25 TABLET, FILM COATED ORAL DAILY
Qty: 30 | Refills: 3 | Status: ACTIVE | COMMUNITY
Start: 2023-09-14 | End: 1900-01-01

## 2023-11-30 NOTE — ED PROVIDER NOTE - SKIN TEMP
VIDEO OFFICE VISIT      Patient: Carly Davey Date of Service: 2023   : 2000 MRN: [id number]     SUBJECTIVE:     Due to COVID-19 ACTION PLAN, the patient's office visit was converted to a video visit.   This visit was performed using zoom call     This patient verbally consents to a video visit. Patient is aware this visit will be treated as a office visit and is billable for insurance purposes if applicable.    Patient states that they are Carly Davey and that they  are speaking to me from their home. I am speaking to the patient from my office        Patient notes the following changes in medical history since last visit: none    Chief Complaint   Patient presents with   • Video Visit     Medication refills        HISTORY OF PRESENT ILLNESS:  Carly Davey is a 23 year old female  Behavioral Problem  This is a chronic problem. The current episode started more than 1 year ago. The problem occurs daily. The problem has been waxing and waning. Pertinent negatives include no anorexia, arthralgias, change in bowel habit, chest pain, coughing, fever, headaches, joint swelling, myalgias, nausea, neck pain, sore throat, swollen glands, urinary symptoms, vertigo or vomiting. Nothing aggravates the symptoms. She has tried nothing for the symptoms. The treatment provided significant relief.   Weight Problem  This is a chronic problem. The current episode started more than 1 year ago. The problem occurs daily. The problem has been waxing and waning. Pertinent negatives include no anorexia, arthralgias, change in bowel habit, chest pain, coughing, fever, headaches, joint swelling, myalgias, nausea, neck pain, sore throat, swollen glands, urinary symptoms, vertigo or vomiting. The symptoms are aggravated by stress and eating. Treatments tried: phentermine. The treatment provided mild relief.           Denies contact with individuals who have tested positive for COVID-19 or that are suspected of having  COVID-19. No recent travel. No recent international travel to countries where COVID-19 is confirmed or suspected.     The following testing was reviewed during this phone visit: none    Medication and allergy reconciliation completed over the phone.       REVIEW OF SYSTEMS:  Review of Systems   Constitutional: Negative for fever and malaise/fatigue.   HENT: Negative for ear pain and sore throat.    Eyes: Negative for pain.   Respiratory: Negative for cough and sputum production.    Cardiovascular: Negative for chest pain.   Gastrointestinal: Negative for anorexia, change in bowel habit, nausea and vomiting.   Genitourinary: Negative for dysuria.   Musculoskeletal: Negative for arthralgias, joint swelling, myalgias and neck pain.   Neurological: Negative for vertigo, sensory change, speech change, focal weakness and headaches.   Psychiatric/Behavioral: Negative for depression.        MEDICATIONS:  Current Outpatient Medications   Medication Sig   • amphetamine-dextroamphetamine XR (ADDERALL XR) 15 MG 24 hr capsule Take 1 capsule by mouth daily.   • phentermine 15 MG capsule Take 1 capsule by mouth every morning.   • norgestimate-ethinyl estradiol, triphasic, (ORTHO TRI-CYCLEN) 0.18/0.215/0.25 MG-35 MCG tablet Take 1 tablet by mouth daily.   • Norgestimate-Ethinyl Estradiol (Tri-Lo-Sprintec) 0.18/0.215/0.25 MG-25 MCG tablet Take 1 tablet by mouth daily.     No current facility-administered medications for this visit.        ALLERGIES:  ALLERGIES:  No Known Allergies    PROBLEM LIST:    Patient Active Problem List   Diagnosis   • Encounter for contraceptive management   • Fatigue   • Overweight on examination   • Attention deficit hyperactivity disorder (ADHD), predominantly inattentive type       PAST MEDICAL HISTORY:  History reviewed. No pertinent past medical history.    PAST SURGICAL HISTORY:  History reviewed. No pertinent surgical history.    FAMILY HISTORY:  History reviewed. No pertinent family  history.    SOCIAL HISTORY:  Social History     Tobacco Use   • Smoking status: Never   • Smokeless tobacco: Never   Substance Use Topics   • Alcohol use: Never   • Drug use: Never         OBJECTIVE:     Physical Exam  Constitutional:       General: She is not in acute distress.     Appearance: She is obese. She is not toxic-appearing.   HENT:      Head: Normocephalic and atraumatic.      Right Ear: External ear normal.      Left Ear: External ear normal.      Nose: Nose normal.      Mouth/Throat:      Mouth: Mucous membranes are moist.   Pulmonary:      Effort: Pulmonary effort is normal.   Abdominal:      General: There is no distension.      Palpations: Abdomen is soft.      Tenderness: There is no abdominal tenderness.   Skin:     General: Skin is warm and dry.   Neurological:      General: No focal deficit present.      Mental Status: She is oriented to person, place, and time.          ASSESSMENT AND PLAN:   This is a 23 year old year-old female who presents with:    The following problems were addressed during today's call:  1. Attention deficit hyperactivity disorder (ADHD), predominantly inattentive type    2. Overweight on examination    3. Encounter for contraceptive management, unspecified type        The following was ordered during today's call:   Orders Placed This Encounter   • amphetamine-dextroamphetamine XR (ADDERALL XR) 15 MG 24 hr capsule   • phentermine 15 MG capsule   • norgestimate-ethinyl estradiol, triphasic, (ORTHO TRI-CYCLEN) 0.18/0.215/0.25 MG-35 MCG tablet       Pt is advised that if symptoms do not improve in 24-48 hours or get worse to contact office for re-evaluation.    Did discuss in length COVID-19 symptoms and precautions to take to prevent infection (frequent hand washing, avoid sick contacts, social distancing 6 feet apart rule and use of masks in all public settings). If having COVID symptoms advised to seek medical care (call our office) or go to ER if moderate to severe  symptoms.      Testing should be completed prior to next visit. New prescriptions / refills sent to the pharmacy.        FOLLOW UP: Return in about 3 months (around 2/29/2024).    Proper usage and side effects of medications reviewed and discussed.   Patient education completed on disease process, etiology, and prognosis.  Return to clinic as clinically indicated.    All questions answered and patient verbalized understanding of the conversation/diagnoses and plan of care.   warm

## 2023-12-21 ENCOUNTER — APPOINTMENT (OUTPATIENT)
Dept: OBGYN | Facility: CLINIC | Age: 28
End: 2023-12-21

## 2023-12-27 RX ORDER — SERTRALINE 25 MG/1
25 TABLET, FILM COATED ORAL DAILY
Qty: 30 | Refills: 3 | Status: ACTIVE | COMMUNITY
Start: 2023-12-27 | End: 1900-01-01

## 2024-06-03 NOTE — ED ADULT TRIAGE NOTE - PAIN: PRESENCE, MLM
Negative Pressure Wound Therapy    Performed by: Park Landa RN  Authorized by: CLEMENTINA Vincent  Universal Protocol:  Consent: Verbal consent obtained.  Risks and benefits: risks, benefits and alternatives were discussed  Consent given by: patient  Patient understanding: patient states understanding of the procedure being performed  Patient identity confirmed: verbally with patient    Performed by::  Clinician  Type::  [facility]  Coverage Size (sq cm)::  150  Pressure Type::  Constant  Sponge Type::  Combination  Sponge Size:  Medium  Npw total number of sponges removed: 1 piece of white foam and 1 piece of black foam.  Npw total number of sponges used: 1 white foam and 1 black foam.       complains of pain/discomfort